# Patient Record
Sex: MALE | Race: WHITE | NOT HISPANIC OR LATINO | Employment: STUDENT | URBAN - METROPOLITAN AREA
[De-identification: names, ages, dates, MRNs, and addresses within clinical notes are randomized per-mention and may not be internally consistent; named-entity substitution may affect disease eponyms.]

---
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
## 2019-02-28 ENCOUNTER — TELEPHONE (OUTPATIENT)
Dept: PSYCHIATRY | Facility: CLINIC | Age: 19
End: 2019-02-28

## 2019-03-11 ENCOUNTER — OFFICE VISIT (OUTPATIENT)
Dept: PSYCHIATRY | Facility: CLINIC | Age: 19
End: 2019-03-11
Payer: COMMERCIAL

## 2019-03-11 VITALS
HEIGHT: 70 IN | BODY MASS INDEX: 19.93 KG/M2 | DIASTOLIC BLOOD PRESSURE: 79 MMHG | WEIGHT: 139.25 LBS | SYSTOLIC BLOOD PRESSURE: 142 MMHG | HEART RATE: 99 BPM

## 2019-03-11 DIAGNOSIS — F84.0 AUTISM SPECTRUM DISORDER: Primary | ICD-10-CM

## 2019-03-11 DIAGNOSIS — F90.8 HYPERKINESIS OF CHILDHOOD WITH DEVELOPMENTAL DELAY: ICD-10-CM

## 2019-03-11 PROCEDURE — 90785 PSYTX COMPLEX INTERACTIVE: CPT | Mod: S$GLB,,, | Performed by: PSYCHIATRY & NEUROLOGY

## 2019-03-11 PROCEDURE — 90833 PSYTX W PT W E/M 30 MIN: CPT | Mod: PBBFAC | Performed by: PSYCHIATRY & NEUROLOGY

## 2019-03-11 PROCEDURE — 90833 PSYTX W PT W E/M 30 MIN: CPT | Mod: S$GLB,,, | Performed by: PSYCHIATRY & NEUROLOGY

## 2019-03-11 PROCEDURE — 90785 PR INTERACTIVE COMPLEXITY: ICD-10-PCS | Mod: S$GLB,,, | Performed by: PSYCHIATRY & NEUROLOGY

## 2019-03-11 PROCEDURE — 99999 PR PBB SHADOW E&M-EST. PATIENT-LVL III: ICD-10-PCS | Mod: PBBFAC,,, | Performed by: PSYCHIATRY & NEUROLOGY

## 2019-03-11 PROCEDURE — 99999 PR PBB SHADOW E&M-EST. PATIENT-LVL III: CPT | Mod: PBBFAC,,, | Performed by: PSYCHIATRY & NEUROLOGY

## 2019-03-11 PROCEDURE — 99213 PR OFFICE/OUTPT VISIT, EST, LEVL III, 20-29 MIN: ICD-10-PCS | Mod: S$GLB,,, | Performed by: PSYCHIATRY & NEUROLOGY

## 2019-03-11 PROCEDURE — 90785 PSYTX COMPLEX INTERACTIVE: CPT | Mod: PBBFAC | Performed by: PSYCHIATRY & NEUROLOGY

## 2019-03-11 PROCEDURE — 99213 OFFICE O/P EST LOW 20 MIN: CPT | Mod: S$GLB,,, | Performed by: PSYCHIATRY & NEUROLOGY

## 2019-03-11 PROCEDURE — 90833 PR PSYCHOTHERAPY W/PATIENT W/E&M, 30 MIN (ADD ON): ICD-10-PCS | Mod: S$GLB,,, | Performed by: PSYCHIATRY & NEUROLOGY

## 2019-03-11 PROCEDURE — 99213 OFFICE O/P EST LOW 20 MIN: CPT | Mod: PBBFAC | Performed by: PSYCHIATRY & NEUROLOGY

## 2019-03-11 RX ORDER — RISPERIDONE 2 MG/1
2 TABLET ORAL NIGHTLY
Qty: 30 TABLET | Refills: 5 | Status: SHIPPED | OUTPATIENT
Start: 2019-03-11 | End: 2019-06-26 | Stop reason: SDUPTHER

## 2019-03-11 RX ORDER — AMANTADINE HYDROCHLORIDE 100 MG/1
100 CAPSULE, GELATIN COATED ORAL EVERY MORNING
Qty: 30 CAPSULE | Refills: 5 | Status: SHIPPED | OUTPATIENT
Start: 2019-03-11 | End: 2019-06-26 | Stop reason: SDUPTHER

## 2019-03-11 NOTE — PROGRESS NOTES
"Outpatient Psychiatry Follow-Up Visit (MD/NP)  3/11/2019    Clinical Status of Patient:  Outpatient (Ambulatory)    Chief Complaint:  Ramiro Hoffman is a 18 y.o. male who presents today for follow-up of altered social communication and impaired social judgment.    Met with patient and then patient with mother.    12th grade for SY18-19  [facility]    Interval History and Content of Current Session:  Interim Events/Subjective Report/Content of Current Session:        Doing about the same: no problems misconduct, no problems with depression or anger. Has "community based instruction" class, social studies, reading/english, math and piano. 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     Psychotherapy:  · Target symptoms: adjustment, montitor for suicidal thoughts, social frustration at school  · Why chosen therapy is appropriate versus another modality: relevant to diagnosis, patient responds to this modality  · Outcome monitoring methods: self-report, observation, teacher report, feedback from family  · Therapeutic intervention type: supportive psychotherapy, interactive psychotherapy  · Topics discussed/themes: relationships difficulties, difficulty managing affect in interpersonal relationships  · The patient's response to the intervention is accepting. The patient's progress toward treatment goals is good.   · Duration of intervention: 20 minutes    Review of Systems   History obtained from the patient.  General ROS: negative for - " fatigue and weight loss. 6 lb weight gain  Psychological ROS: negative for - depression, hallucinations, hostility, mood swings, physical abuse, sexual abuse or sleep disturbances  Ophthalmic ROS: negative for - decreased vision or dry eyes  ENT ROS: negative for - epistaxis, nasal congestion or oral lesions  Hematological and Lymphatic ROS: negative for - bruising, jaundice or pallor  Endocrine ROS: negative for - galactorrhea, skin changes or temperature intolerance  Respiratory ROS: no cough or shortness of breath  Cardiovascular ROS: no chest pain or tachycardia  Gastrointestinal ROS: no abdominal pain, change in bowel habits  Urinary ROS: no dysuria, trouble voiding or hematuria  Neurological ROS: negative for - headaches, seizures, tremors. +eyeblink tics when he feels anxious but denies noticing any other AIMS  Dermatological ROS: negative for pruritus. + mild cystic acne    Past Medical, Family and Social History: The patient's past medical, family and social history have been reviewed and updated as appropriate within the electronic medical record - see encounter notes.  Past Medical History:   Diagnosis Date    ADHD (attention deficit hyperactivity disorder)     Autism spectrum disorder 2004     Current Outpatient Medications on File Prior to Visit   Medication Sig Dispense Refill    amantadine HCl (SYMMETREL) 100 mg capsule Take 1 capsule (100 mg total) by mouth every morning. 90 capsule 1    risperiDONE (RISPERDAL) 2 MG tablet Take 1 tablet (2 mg total) by mouth every evening. 90 tablet 1     No current facility-administered medications on file prior to visit.    Compliance: yes  Side effects: None. Ramiro denies any problems with headache, stomach upset, weight loss, insomnia, chest pain, palpitations, tics, or tremors.    Risk Parameters:  Patient reports no suicidal ideation  Patient reports no homicidal ideation  Patient reports no self-injurious behavior  Patient reports no violent behavior  "  Ramiro denies any use of alcohol, cannabis, or other drugs.    Exam (detailed: at least 9 elements; comprehensive: all 15 elements)   Constitutional  Vitals:   height is 5' 10" (1.778 m) and weight is 63.2 kg (139 lb 3.5 oz). His blood pressure is 142/79 (abnormal) and his pulse is 99.      General:  age appropriate, casually dressed, no change in acne since last visit   Musculoskeletal  Muscle Strength/Tone:  no tremor, no tic.    Gait & Station:  non-ataxic   Psychiatric  Speech:  spontaneous, monotone, conversational volume   Mood & Affect:  euthymic  blunted, mood-congruent   Thought Process:  concrete, with some over-personalized tangents   Associations:  intact   Thought Content:  normal, no suicidality, no homicidality, delusions, or paranoia-no.    Insight:  has awareness of illness, but it is concrete and rudimentary, though he is alot less defensive about it than he was 2 years ago   Judgement: impaired due to autism   Orientation:  person, place, time/date, situation, day of week   Memory: intact for content of interview   Language: awkward prosody, some semantic/pragmatic errors, low vocabulary for age.   Attention Span & Concentration:  able to focus   Fund of Knowledge:  familiar with aspects of current personal life     Assessment and Diagnosis   Status/Progress: Based on the examination today, the patient's problem(s) is/are optimally controlled given his ability.  New problems have not been presented today. Comorbidities are complicating management of the primary condition.  There are no active rule-out diagnoses for this patient at this time.    General Impression:   1. Autism spectrum disorder, level 2     2. Hyperkinesis of childhood with developmental delay       Intervention/Counseling/Treatment Plan   · Medication Management: continue current medications, risperidone 2 mg qHS and amantadine 100 mg daily  · Outside records/collateral information from additional sources: reviewed collateral " reports from his parents  · Counseling provided with family as follows: techniques he continues to appropriately confront and protect himself from bullying behavior by peers, risks and benefits of treatment options, including medications, were discussed with the patient, risk factor reduction, family education  · anticipates Pay Check program at [facility] starting August 2019    Return to Clinic: 3 months    INTERACTIVE COMPLEXITY:  Reason: Expressive communication skills have not developed adequately to explain symptoms and response to treatment, requiring the use of interactive methods and materials to elicit data.    ___________________________________________________________________________________________________    (for my future reference as he begins the below program near the time of next visit)   Post-Secondary Apprenticeship for Youth (Pay Check)    The Postsecondary Apprenticeship  for Youth (Pay Check)  is an innovative collaboration between the [facility] ([facility]), [facility], [employer] [facility], [employer], public [facility] [employer] school associations, and the [facility].     Pay Check is a 3-5 semester program wherein students select courses at [employer] related to [facility] targeted apprenticeship areas, participate in professional career development activities, learn community and work skills, and gain employment experience through a paid apprenticeship at the [facility].    [employer] First Semester Courses    Survey of Computer Applications  College Career Success Skills  Personal Finance  Customer Service  Introduction to Business  Interpersonal Communications  Document Production  General Office Procedures  First Aid  American Sign Language  Fitness  [facility] Apprenticeship Area Examples    Sterile Processing                      (Patient Escort)  Patient  Access  Human Resources Clert  OT/PT Aide  Central [employer] Supply  Food, Dietary & Nutrition  Pre-Employment Transition Skills and On-the-Job Training    Work-Based Learning Experiences  Job Exploration  Interview Skills  Transportation Skills  Navigational Skills  Independent Living Skills            Time Management  Problem Solving Skills  Banking  Budget and Money Management Skills  Self-Advocacy  Goal Setting  Social Interactions  Stress Management     Student Admission Criteria    Be 18-22 years of age with IEP or Section 504 Plan  Eligible for LRS services  Submit a brief description of why they want to complete the PAY Check program including letters of recommendation  Parents/guardians must support their student to participate in community college, learn community and work-related skills, accept a paid apprenticeship, and ultimately support their student to reach the outcome of competitive employment in the career of their choice.

## 2019-04-15 ENCOUNTER — OFFICE VISIT (OUTPATIENT)
Dept: INTERNAL MEDICINE | Facility: CLINIC | Age: 19
End: 2019-04-15
Payer: COMMERCIAL

## 2019-04-15 ENCOUNTER — TELEPHONE (OUTPATIENT)
Dept: INTERNAL MEDICINE | Facility: CLINIC | Age: 19
End: 2019-04-15

## 2019-04-15 VITALS
HEART RATE: 70 BPM | HEIGHT: 70 IN | DIASTOLIC BLOOD PRESSURE: 80 MMHG | WEIGHT: 135.38 LBS | SYSTOLIC BLOOD PRESSURE: 120 MMHG | OXYGEN SATURATION: 99 % | BODY MASS INDEX: 19.38 KG/M2

## 2019-04-15 DIAGNOSIS — E73.9 LACTOSE INTOLERANCE: ICD-10-CM

## 2019-04-15 DIAGNOSIS — H61.20 IMPACTED CERUMEN, UNSPECIFIED LATERALITY: Primary | ICD-10-CM

## 2019-04-15 PROCEDURE — 99213 OFFICE O/P EST LOW 20 MIN: CPT | Mod: PBBFAC | Performed by: INTERNAL MEDICINE

## 2019-04-15 PROCEDURE — 99999 PR PBB SHADOW E&M-EST. PATIENT-LVL III: ICD-10-PCS | Mod: PBBFAC,,, | Performed by: INTERNAL MEDICINE

## 2019-04-15 PROCEDURE — 99213 PR OFFICE/OUTPT VISIT, EST, LEVL III, 20-29 MIN: ICD-10-PCS | Mod: S$PBB,,, | Performed by: INTERNAL MEDICINE

## 2019-04-15 PROCEDURE — 99999 PR PBB SHADOW E&M-EST. PATIENT-LVL III: CPT | Mod: PBBFAC,,, | Performed by: INTERNAL MEDICINE

## 2019-04-15 PROCEDURE — 99213 OFFICE O/P EST LOW 20 MIN: CPT | Mod: S$PBB,,, | Performed by: INTERNAL MEDICINE

## 2019-04-15 NOTE — PATIENT INSTRUCTIONS
High Fiber Diet  Fiber is present in all fruits, vegetables, cereals and grains. Fiber passes through the body undigested. A high fiber diet helps food move through the intestinal tract. The added bulk is helpful in preventing constipation. In people with diverticulosis it serves to clean out the pouches along the colon wall while preventing new ones from forming. A high fiber diet also reduces the risk of colon cancer, decreases blood cholesterol and prevents high blood sugar in people with diabetes.    The foods listed below are high in fiber and should be included in your diet. If you are not used to high fiber foods, start with 1 or 2 foods from this list. Every 3-4 days add a new one to your diet until you are eating 4 high fiber foods per day. This should give you 20-35 Gm of fiber/day. It is also important to drink a lot of water when you are on this diet (6-8 glasses a day). Water causes the fiber to swell and increases the benefit.  Foods High In Dietary Fiber:  BREADS: Made with 100% whole wheat flour; kayden, wheat or rye crackers; tortillas, bran muffins  CEREALS: Whole grain cereal with bran (Chex, Raisin Bran, Corn Bran), oatmeal, rolled oats, granola, wheat flakes, brown rice  NUTS: Any nuts  FRUITS: All fresh fruits along with edible skins, (bananas, citrus fruit, mangoes, pears, prunes, raisins, apples, pineapple, apricot, melon, jams and marmalades), fruit juices (especially prune juice)  VEGETABLES: All types, preferably raw or lightly cooked: especially, celery, eggplant, potatoes,spinach, broccoli, brussel sprouts, winter squash, carrots, cauliflower, soybeans, lentils, fresh and dried beans of all kinds  OTHER: Popcorn, any spices  © 9261-8149 [employer]. [address] [place] [address], [place] [address]. All rights reserved. This information is not intended as a substitute for professional medical care. Always follow your healthcare professional's instructions.

## 2019-04-15 NOTE — PROGRESS NOTES
Subjective:       Patient ID: Ramiro Hoffman is a 18 y.o. male.    Chief Complaint: Follow-up (4 month (needs a excused note to go back to school))    Patient is here for followup for chronic conditions.    Wt has been stable.    Denies loose stools or significant abd pains. One time diff swallowing mild choking on popcorn kernel.    Some stress at school, same as he has been dealing with.    Review of Systems   Constitutional: Negative for appetite change and unexpected weight change.   Respiratory: Negative for chest tightness and shortness of breath.    Cardiovascular: Negative for chest pain.   Gastrointestinal: Negative for abdominal pain.   Endocrine: Negative for cold intolerance and heat intolerance.   Genitourinary: Negative for difficulty urinating, scrotal swelling and testicular pain.   Skin:        No lesions   Psychiatric/Behavioral: Negative for dysphoric mood, self-injury, sleep disturbance and suicidal ideas. The patient is nervous/anxious.        Objective:      Physical Exam   Constitutional: He is oriented to person, place, and time. He appears well-developed and well-nourished. No distress.   HENT:   Head: Normocephalic and atraumatic.   Cerumen impaction bilat   Eyes: No scleral icterus.   Neck: Normal range of motion. No thyromegaly present.   Cardiovascular: Normal rate, regular rhythm and normal heart sounds. Exam reveals no gallop and no friction rub.   No murmur heard.  Pulmonary/Chest: Effort normal and breath sounds normal. No respiratory distress. He has no wheezes. He has no rales.   Abdominal: Soft. Bowel sounds are normal. He exhibits no distension and no mass. There is no tenderness. There is no rebound and no guarding.   Musculoskeletal: Normal range of motion. He exhibits no edema.   Lymphadenopathy:     He has no cervical adenopathy.   Neurological: He is alert and oriented to person, place, and time.   Skin: No lesion noted.   Psychiatric: He has a normal mood and affect.  Thought content normal.       Assessment:       1. Impacted cerumen, unspecified laterality    2. Lactose intolerance        Plan:       Ramiro was seen today for follow-up.    Diagnoses and all orders for this visit:    Lactose intolerance  Pt given handouts on fiber intake.    Ear wax cerumen -- removal attempted but not successful, will refer to ENT    Keep a track on his wt. Sister likely has celiac but he lacks symptoms.    Stress at school work with school SW    Health Maintenance       Date Due Completion Date    TETANUS VACCINE 09/07/2020 9/7/2010          Follow up in about 1 year (around 4/15/2020).    No future appointments.

## 2019-06-06 ENCOUNTER — OFFICE VISIT (OUTPATIENT)
Dept: OTOLARYNGOLOGY | Facility: CLINIC | Age: 19
End: 2019-06-06
Payer: COMMERCIAL

## 2019-06-06 VITALS
BODY MASS INDEX: 19.3 KG/M2 | SYSTOLIC BLOOD PRESSURE: 133 MMHG | HEART RATE: 92 BPM | DIASTOLIC BLOOD PRESSURE: 81 MMHG | WEIGHT: 134.5 LBS

## 2019-06-06 DIAGNOSIS — H61.23 BILATERAL IMPACTED CERUMEN: Primary | ICD-10-CM

## 2019-06-06 PROCEDURE — 69210 REMOVE IMPACTED EAR WAX UNI: CPT | Mod: S$GLB,,, | Performed by: NURSE PRACTITIONER

## 2019-06-06 PROCEDURE — 99999 PR PBB SHADOW E&M-EST. PATIENT-LVL II: ICD-10-PCS | Mod: PBBFAC,,, | Performed by: NURSE PRACTITIONER

## 2019-06-06 PROCEDURE — 99999 PR PBB SHADOW E&M-EST. PATIENT-LVL II: CPT | Mod: PBBFAC,,, | Performed by: NURSE PRACTITIONER

## 2019-06-06 PROCEDURE — 99499 NO LOS: ICD-10-PCS | Mod: S$GLB,,, | Performed by: NURSE PRACTITIONER

## 2019-06-06 PROCEDURE — 69210 EAR CERUMEN REMOVAL: ICD-10-PCS | Mod: S$GLB,,, | Performed by: NURSE PRACTITIONER

## 2019-06-06 PROCEDURE — 99499 UNLISTED E&M SERVICE: CPT | Mod: S$GLB,,, | Performed by: NURSE PRACTITIONER

## 2019-06-06 NOTE — PROCEDURES
Ear Cerumen Removal  Date/Time: 6/6/2019 10:23 AM  Performed by: Garfield Foss NP  Authorized by: Garfield Foss NP     Location details:  Both ears  Procedure type: curette    Cerumen  Removal Results:  Cerumen completely removed  Patient tolerance:  Patient tolerated the procedure well with no immediate complications     Procedure Note:    The patient was brought to the minor procedure room and placed under the operating microscope of the left ear canal which was cleaned of ceruminous debris. Using a combination of suction, curettes and cup forceps the patient's cerumen impaction was removed. The tympanic membrane was evaluated and was unremarkable. The patient tolerated the procedure well. There were no complications.  Procedure Note:    Patient was brought to the minor procedure room and using the operating microscope of the right ear canal which was cleaned of ceruminous debris and four oval, pearly rocks. There was a significant cerumen impaction.  Using a combination of suction, curettes and cup forceps the patient's cerumen impaction was removed. Tympanic membrane intact. Pt tolerated well. There were no complications.

## 2019-06-06 NOTE — LETTER
June 6, 2019      Scott Hinton MD  [address]  [facility] [address]           [facility] - Otorhinolaryngology  [address]  [facility] [address]  Phone: [phone]  Fax: [phone]          Patient: Ramiro Hoffman   MR Number: [id number]   YOB: 2000   Date of Visit: 6/6/2019       Dear Dr. Scott Hinton:    Thank you for referring Ramiro Hoffman to me for evaluation. Attached you will find relevant portions of my assessment and plan of care.    If you have questions, please do not hesitate to call me. I look forward to following Ramiro Hoffman along with you.    Sincerely,    Garfield Foss, NP    Enclosure  CC:  No Recipients    If you would like to receive this communication electronically, please contact [email or web address] or [phone] to request more information on [facility] access.    For providers and/or their staff who would like to refer a patient to [facility], please contact us through our one-stop-shop provider referral line, [facility], at [phone].    If you feel you have received this communication in error or would no longer like to receive these types of communications, please e-mail [email or web address]

## 2019-06-26 ENCOUNTER — OFFICE VISIT (OUTPATIENT)
Dept: PSYCHIATRY | Facility: CLINIC | Age: 19
End: 2019-06-26
Payer: COMMERCIAL

## 2019-06-26 VITALS
WEIGHT: 138.88 LBS | DIASTOLIC BLOOD PRESSURE: 84 MMHG | SYSTOLIC BLOOD PRESSURE: 139 MMHG | BODY MASS INDEX: 19.88 KG/M2 | HEART RATE: 103 BPM | HEIGHT: 70 IN

## 2019-06-26 DIAGNOSIS — F90.8 HYPERKINESIS OF CHILDHOOD WITH DEVELOPMENTAL DELAY: ICD-10-CM

## 2019-06-26 DIAGNOSIS — F84.0 AUTISM SPECTRUM DISORDER: Primary | ICD-10-CM

## 2019-06-26 PROCEDURE — 99213 OFFICE O/P EST LOW 20 MIN: CPT | Mod: S$GLB,,, | Performed by: PSYCHIATRY & NEUROLOGY

## 2019-06-26 PROCEDURE — 90833 PSYTX W PT W E/M 30 MIN: CPT | Mod: S$GLB,,, | Performed by: PSYCHIATRY & NEUROLOGY

## 2019-06-26 PROCEDURE — 99999 PR PBB SHADOW E&M-EST. PATIENT-LVL III: CPT | Mod: PBBFAC,,, | Performed by: PSYCHIATRY & NEUROLOGY

## 2019-06-26 PROCEDURE — 90833 PR PSYCHOTHERAPY W/PATIENT W/E&M, 30 MIN (ADD ON): ICD-10-PCS | Mod: S$GLB,,, | Performed by: PSYCHIATRY & NEUROLOGY

## 2019-06-26 PROCEDURE — 99999 PR PBB SHADOW E&M-EST. PATIENT-LVL III: ICD-10-PCS | Mod: PBBFAC,,, | Performed by: PSYCHIATRY & NEUROLOGY

## 2019-06-26 PROCEDURE — 3008F BODY MASS INDEX DOCD: CPT | Mod: CPTII,S$GLB,, | Performed by: PSYCHIATRY & NEUROLOGY

## 2019-06-26 PROCEDURE — 99213 PR OFFICE/OUTPT VISIT, EST, LEVL III, 20-29 MIN: ICD-10-PCS | Mod: S$GLB,,, | Performed by: PSYCHIATRY & NEUROLOGY

## 2019-06-26 PROCEDURE — 3008F PR BODY MASS INDEX (BMI) DOCUMENTED: ICD-10-PCS | Mod: CPTII,S$GLB,, | Performed by: PSYCHIATRY & NEUROLOGY

## 2019-06-26 RX ORDER — RISPERIDONE 2 MG/1
2 TABLET ORAL NIGHTLY
Qty: 30 TABLET | Refills: 5 | Status: SHIPPED | OUTPATIENT
Start: 2019-06-26 | End: 2019-12-03 | Stop reason: SDUPTHER

## 2019-06-26 RX ORDER — AMANTADINE HYDROCHLORIDE 100 MG/1
100 CAPSULE, GELATIN COATED ORAL EVERY MORNING
Qty: 30 CAPSULE | Refills: 5 | Status: SHIPPED | OUTPATIENT
Start: 2019-06-26 | End: 2019-12-03 | Stop reason: SDUPTHER

## 2019-06-26 NOTE — PROGRESS NOTES
"Outpatient Psychiatry Follow-Up Visit (MD/NP)  6/26/2019    Clinical Status of Patient:  Outpatient (Ambulatory)    Chief Complaint:  Ramiro Hoffman is a 18 y.o. male who presents today for follow-up of altered social communication and impaired social judgment.    Met with patient and then patient with mother.    Graduated with certificate May 2019  [employer]    Interval History and Content of Current Session:  Interim Events/Subjective Report/Content of Current Session:        Doing about the same: no problems misconduct, no problems with depression or anger. Has "community based instruction" class, social studies, reading/english, math and piano. Still claims girlfriend at school who is also a spec ed student, but reports he feels "jealous, like a feel like she is cheating when she sits next to this other dusty more than she sits next to me and he puts his arm around her." Discussed what it might take to push him to get another girlfriend         Starts [employer] program at [facility] starting in August 2019        Has gained back 6 lb of the 25 lb he had lost last fall.     Psychotherapy:  · Target symptoms: distractability, recurrent depression, social adjustment to major life transition  · Why chosen therapy is appropriate versus another modality: relevant to diagnosis, patient responds to this modality  · Outcome monitoring methods: self-report, observation, teacher report, feedback from family  · Therapeutic intervention type: supportive psychotherapy, interactive psychotherapy  · Topics discussed/themes: relationships difficulties, difficulty managing affect in interpersonal relationships  · The patient's response to the intervention is accepting. The patient's progress toward treatment goals is good.   · Duration of intervention: 20 minutes    Review of Systems   History obtained from the patient.  General ROS: negative for - fatigue and weight loss. Positive for- 4 lb weight gain since last " visit, now at ideal body weight  Psychological ROS: negative for - depression, hallucinations, hostility, mood swings, physical abuse, sexual abuse or sleep disturbances  Ophthalmic ROS: negative for - decreased vision or dry eyes  ENT ROS: negative for - epistaxis, nasal congestion or oral lesions  Hematological and Lymphatic ROS: negative for - bruising, jaundice or pallor  Endocrine ROS: negative for - galactorrhea, skin changes or temperature intolerance  Respiratory ROS: no cough or shortness of breath  Cardiovascular ROS: no chest pain or tachycardia  Gastrointestinal ROS: no abdominal pain, change in bowel habits  Urinary ROS: no dysuria, trouble voiding or hematuria  Neurological ROS: negative for - headaches, seizures, tremors. +eyeblink tics when he feels anxious but denies noticing any other AIMS  Dermatological ROS: negative for pruritus. + mild cystic acne    Past Medical, Family and Social History: The patient's past medical, family and social history have been reviewed and updated as appropriate within the electronic medical record - see encounter notes.  Past Medical History:   Diagnosis Date    ADHD (attention deficit hyperactivity disorder)     Autism spectrum disorder 2004    Lactose intolerance      Current Outpatient Medications on File Prior to Visit   Medication Sig Dispense Refill    amantadine HCl (SYMMETREL) 100 mg capsule Take 1 capsule (100 mg total) by mouth every morning. 90 capsule 1    risperiDONE (RISPERDAL) 2 MG tablet Take 1 tablet (2 mg total) by mouth every evening. 90 tablet 1     No current facility-administered medications on file prior to visit.    Compliance: yes  Side effects: None. Ramiro denies any problems with headache, stomach upset, weight loss, insomnia, chest pain, palpitations, tics, or tremors.    Risk Parameters:  Patient reports no suicidal ideation  Patient reports no homicidal ideation  Patient reports no self-injurious behavior  Patient reports no  "violent behavior   Ramiro denies any use of alcohol, cannabis, or other drugs.    Exam (detailed: at least 9 elements; comprehensive: all 15 elements)   Constitutional  Vitals:   height is 5' 10" (1.778 m) and weight is 63 kg (138 lb 14.2 oz). His blood pressure is 139/84 and his pulse is 103.      General:  age appropriate, casually dressed, acne improved   Musculoskeletal  Muscle Strength/Tone:  no tremor, no tic.    Gait & Station:  non-ataxic   Psychiatric  Speech:  spontaneous, monotone, conversational volume   Mood & Affect:  euthymic  blunted, mood-congruent   Thought Process:  concrete, with some over-personalized tangents   Associations:  intact   Thought Content:  normal, no suicidality, no homicidality, delusions, or paranoia-no.    Insight:  has awareness of illness, but it is concrete and rudimentary, though he is no longer defensive about it   Judgement: impaired due to autism   Orientation:  person, place, time/date, situation, day of week   Memory: intact for content of interview   Language: awkward prosody, some semantic/pragmatic errors, low vocabulary for age.   Attention Span & Concentration:  able to focus   Fund of Knowledge:  familiar with aspects of current personal life     Assessment and Diagnosis   Status/Progress: Based on the examination today, the patient's problem(s) is/are optimally controlled given his ability.  New problems have not been presented today. Comorbidities are complicating management of the primary condition.  There are no active rule-out diagnoses for this patient at this time.    General Impression:   1. Autism spectrum disorder, level 2     2. Hyperkinesis of childhood with developmental delay       Intervention/Counseling/Treatment Plan   · Medication Management: continue current medications, risperidone 2 mg qHS and amantadine 100 mg daily  · Outside records/collateral information from additional sources: reviewed collateral reports from his parents  · Counseling " provided with family as follows: techniques he continues to appropriately confront and protect himself from bullying behavior by peers, risks and benefits of treatment options, including medications, were discussed with the patient, risk factor reduction, family education  · anticipates Pay Check program at [facility] starting August 2019    Return to Clinic: 3 months    ___________________________________________________________________________________________________    (for my future reference as he begins the below program on August 9, 2019)     Post-Secondary Apprenticeship for Youth (Pay Check)    The Postsecondary Apprenticeship  for Youth (Pay Check)  is an innovative collaboration between the [facility] [place] ([facility]), [facility], [facility], [employer], public [facility] school associations, and the [facility].     Pay Check is a 3-5 semester program wherein students select courses at [facility] related to [facility] targeted apprenticeship areas, participate in professional career development activities, learn community and work skills, and gain employment experience through a paid apprenticeship at the [facility].    [facility] First Semester Courses    Survey of Computer Applications  College Career Success Skills  Personal Finance  Customer Service  Introduction to Business  Interpersonal Communications  Document Production  General Office Procedures  First Aid  American Sign Language  Fitness  [facility] Apprenticeship Area Examples    Sterile Processing                      (Patient Escort)  Patient Access  Human Resources Clert  OT/PT Aide  Central [employer] Supply  Food, Dietary & Nutrition  Pre-Employment Transition Skills and On-the-Job Training    Work-Based Learning Experiences  Job Exploration  Interview Skills  Transportation Skills  Navigational  Skills  Independent Living Skills            Time Management  Problem Solving Skills  Banking  Budget and Money Management Skills  Self-Advocacy  Goal Setting  Social Interactions  Stress Management     Student Admission Criteria    Be 18-22 years of age with IEP or Section 504 Plan  Eligible for LRS services  Submit a brief description of why they want to complete the PAY Check program including letters of recommendation  Parents/guardians must support their student to participate in community college, learn community and work-related skills, accept a paid apprenticeship, and ultimately support their student to reach the outcome of competitive employment in the career of their choice.

## 2019-12-03 ENCOUNTER — OFFICE VISIT (OUTPATIENT)
Dept: PSYCHIATRY | Facility: CLINIC | Age: 19
End: 2019-12-03
Payer: COMMERCIAL

## 2019-12-03 VITALS
DIASTOLIC BLOOD PRESSURE: 68 MMHG | BODY MASS INDEX: 19.61 KG/M2 | HEART RATE: 91 BPM | SYSTOLIC BLOOD PRESSURE: 121 MMHG | WEIGHT: 136.69 LBS

## 2019-12-03 DIAGNOSIS — F84.0 AUTISM SPECTRUM DISORDER: Primary | ICD-10-CM

## 2019-12-03 DIAGNOSIS — Z79.899 ENCOUNTER FOR LONG-TERM (CURRENT) USE OF OTHER MEDICATIONS: ICD-10-CM

## 2019-12-03 DIAGNOSIS — F90.8 HYPERKINESIS OF CHILDHOOD WITH DEVELOPMENTAL DELAY: ICD-10-CM

## 2019-12-03 PROCEDURE — 3008F BODY MASS INDEX DOCD: CPT | Mod: CPTII,S$GLB,, | Performed by: PSYCHIATRY & NEUROLOGY

## 2019-12-03 PROCEDURE — 99213 PR OFFICE/OUTPT VISIT, EST, LEVL III, 20-29 MIN: ICD-10-PCS | Mod: S$GLB,,, | Performed by: PSYCHIATRY & NEUROLOGY

## 2019-12-03 PROCEDURE — 99999 PR PBB SHADOW E&M-EST. PATIENT-LVL III: CPT | Mod: PBBFAC,,, | Performed by: PSYCHIATRY & NEUROLOGY

## 2019-12-03 PROCEDURE — 99999 PR PBB SHADOW E&M-EST. PATIENT-LVL III: ICD-10-PCS | Mod: PBBFAC,,, | Performed by: PSYCHIATRY & NEUROLOGY

## 2019-12-03 PROCEDURE — 90833 PR PSYCHOTHERAPY W/PATIENT W/E&M, 30 MIN (ADD ON): ICD-10-PCS | Mod: S$GLB,,, | Performed by: PSYCHIATRY & NEUROLOGY

## 2019-12-03 PROCEDURE — 3008F PR BODY MASS INDEX (BMI) DOCUMENTED: ICD-10-PCS | Mod: CPTII,S$GLB,, | Performed by: PSYCHIATRY & NEUROLOGY

## 2019-12-03 PROCEDURE — 99213 OFFICE O/P EST LOW 20 MIN: CPT | Mod: S$GLB,,, | Performed by: PSYCHIATRY & NEUROLOGY

## 2019-12-03 PROCEDURE — 90833 PSYTX W PT W E/M 30 MIN: CPT | Mod: S$GLB,,, | Performed by: PSYCHIATRY & NEUROLOGY

## 2019-12-03 RX ORDER — RISPERIDONE 2 MG/1
2 TABLET ORAL NIGHTLY
Qty: 30 TABLET | Refills: 5 | Status: SHIPPED | OUTPATIENT
Start: 2019-12-03 | End: 2020-08-19 | Stop reason: SDUPTHER

## 2019-12-03 RX ORDER — AMANTADINE HYDROCHLORIDE 100 MG/1
100 CAPSULE, GELATIN COATED ORAL EVERY MORNING
Qty: 30 CAPSULE | Refills: 5 | Status: SHIPPED | OUTPATIENT
Start: 2019-12-03 | End: 2020-08-19 | Stop reason: SDUPTHER

## 2019-12-03 NOTE — PROGRESS NOTES
"Outpatient Psychiatry Follow-Up Visit (MD/NP)  12/3/2019    Clinical Status of Patient:  Outpatient (Ambulatory)    Chief Complaint:  Ramiro Hoffman is a 19 y.o. male who presents today for follow-up of altered social communication and impaired social judgment.    Met with patient and then patient with mother.    Graduated with certificate May 2019  [employer]-  Now in a post-socondary program that I describe as a post-script to this note    Interval History and Content of Current Session:  Interim Events/Subjective Report/Content of Current Session:        Doing about the same: no problems misconduct, no problems with depression or anger. Has "community based instruction" class, social studies, reading/english, math and piano. Still claims girlfriend at school who is also a spec ed student, but reports he feels "jealous, like a feel like she is cheating when she sits next to this other dusty more than she sits next to me and he puts his arm around her." Discussed what it might take to push him to get another girlfriend          Likes very much and says he is doing well at [employer] program at [facility] that he started this past summer. 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  [employer] one day a week- cleaning toys  One day a week in services at [facility]  Half to one day a week in [employer] training  Half day social event/outing once weekly              Psychotherapy:  · Target symptoms: distractability, recurrent depression, social adjustment to major life transition  · Why chosen therapy is appropriate versus another modality: relevant to diagnosis, patient responds to this modality  · Outcome monitoring methods: self-report, observation, teacher report, feedback from family  · Therapeutic " intervention type: supportive psychotherapy, interactive psychotherapy  · Topics discussed/themes: relationships difficulties, difficulty managing affect in interpersonal relationships  · The patient's response to the intervention is accepting. The patient's progress toward treatment goals is good.   · Duration of intervention: 20 minutes    Review of Systems   History obtained from the patient.  General ROS: negative for - fatigue and weight loss. Positive for- 4 lb weight gain since last visit, now at ideal body weight  Psychological ROS: negative for - depression, hallucinations, hostility, mood swings, physical abuse, sexual abuse or sleep disturbances  Ophthalmic ROS: negative for - decreased vision or dry eyes  ENT ROS: negative for - epistaxis, nasal congestion or oral lesions  Hematological and Lymphatic ROS: negative for - bruising, jaundice or pallor  Endocrine ROS: negative for - galactorrhea, skin changes or temperature intolerance  Respiratory ROS: no cough or shortness of breath  Cardiovascular ROS: no chest pain or tachycardia  Gastrointestinal ROS: no abdominal pain, change in bowel habits  Urinary ROS: no dysuria, trouble voiding or hematuria  Neurological ROS: negative for - headaches, seizures, tremors. +eyeblink tics when he feels anxious but denies noticing any other AIMS  Dermatological ROS: negative for pruritus. + mild cystic acne    Past Medical, Family and Social History: The patient's past medical, family and social history have been reviewed and updated as appropriate within the electronic medical record - see encounter notes.  Past Medical History:   Diagnosis Date    ADHD (attention deficit hyperactivity disorder)     Autism spectrum disorder 2004    Lactose intolerance      Current Outpatient Medications on File Prior to Visit   Medication Sig Dispense Refill    amantadine HCl (SYMMETREL) 100 mg capsule Take 1 capsule (100 mg total) by mouth every morning. 90 capsule 1     risperiDONE (RISPERDAL) 2 MG tablet Take 1 tablet (2 mg total) by mouth every evening. 90 tablet 1     No current facility-administered medications on file prior to visit.    Compliance: yes  Side effects: None. Ramiro denies any problems with headache, stomach upset, weight loss, insomnia, chest pain, palpitations, tics, or tremors.    Risk Parameters:  Patient reports no suicidal ideation  Patient reports no homicidal ideation  Patient reports no self-injurious behavior  Patient reports no violent behavior   Ramiro denies any use of alcohol, cannabis, or other drugs.    Exam (detailed: at least 9 elements; comprehensive: all 15 elements)   Constitutional  Vitals:   weight is 62 kg (136 lb 11 oz). His blood pressure is 121/68 and his pulse is 91.      General:  age appropriate, casually dressed, acne improved   Musculoskeletal  Muscle Strength/Tone:  no tremor, no tic.    Gait & Station:  non-ataxic   Psychiatric  Speech:  spontaneous, monotone, conversational volume   Mood & Affect:  euthymic  blunted, mood-congruent   Thought Process:  concrete, with some over-personalized tangents   Associations:  intact   Thought Content:  normal, no suicidality, no homicidality, delusions, or paranoia-no.    Insight:  has awareness of illness, but it is concrete and rudimentary, though he is no longer defensive about it   Judgement: impaired due to autism   Orientation:  person, place, time/date, situation, day of week   Memory: intact for content of interview   Language: awkward prosody, some semantic/pragmatic errors, low vocabulary for age.   Attention Span & Concentration:  able to focus   Fund of Knowledge:  familiar with aspects of current personal life     Assessment and Diagnosis   Status/Progress: Based on the examination today, the patient's problem(s) is/are optimally controlled given his ability.  New problems have not been presented today. Comorbidities are complicating management of the primary condition.   There are no active rule-out diagnoses for this patient at this time.    General Impression:   1. Autism spectrum disorder, level 2     2. Hyperkinesis of childhood with developmental delay     3. Encounter for long-term (current) use of other medications  Hemoglobin A1c    Lipid panel    Comprehensive metabolic panel    CBC auto differential     Intervention/Counseling/Treatment Plan   · Medication Management: continue current medications, risperidone 2 mg qHS and amantadine 100 mg daily  · Outside records/collateral information from additional sources: reviewed collateral reports from his parents  · Counseling provided with family as follows: techniques he continues to appropriately confront and protect himself from bullying behavior by peers, risks and benefits of treatment options, including medications, were discussed with the patient, risk factor reduction, family education  · continuing Pay Check program at [facility]    Return to Clinic: 6 months    ___________________________________________________________________________________________________    (for my future reference as he begins the below program on August 9, 2019)     Post-Secondary Apprenticeship for Youth (Pay Check)    The Postsecondary Apprenticeship  for Youth (Pay Check)  is an innovative collaboration between the [facility] ([facility]), [facility], [facility], [employer], public [facility] school associations, and the [facility].     Pay Check is a 3-5 semester program wherein students select courses at [facility] related to [facility] targeted apprenticeship areas, participate in professional career development activities, learn community and work skills, and gain employment experience through a paid apprenticeship at the [facility] [facility].    [facility] First Semester Courses    Survey of Tutor Assignment  Applications  College Career Success Skills  Personal Finance  Customer Service  Introduction to Business  Interpersonal Communications  Document Production  General Office Procedures  First Aid  American Sign Language  Fitness  [facility] Apprenticeship Area Examples    Sterile Processing                      (Patient Escort)  Patient Access  Human Resources Clert  OT/PT Aide  [employer]  Food, Dietary & Nutrition  Pre-Employment Transition Skills and On-the-Job Training    Work-Based Learning Experiences  Job Exploration  Interview Skills  Transportation Skills  Navigational Skills  Independent Living Skills            Time Management  Problem Solving Skills  Banking  Budget and Money Management Skills  Self-Advocacy  Goal Setting  Social Interactions  Stress Management     Student Admission Criteria    Be 18-22 years of age with IEP or Section 504 Plan  Eligible for LRS services  Submit a brief description of why they want to complete the PAY Check program including letters of recommendation  Parents/guardians must support their student to participate in community college, learn community and work-related skills, accept a paid apprenticeship, and ultimately support their student to reach the outcome of competitive employment in the career of their choice.

## 2019-12-26 ENCOUNTER — LAB VISIT (OUTPATIENT)
Dept: LAB | Facility: HOSPITAL | Age: 19
End: 2019-12-26
Attending: PSYCHIATRY & NEUROLOGY
Payer: COMMERCIAL

## 2019-12-26 ENCOUNTER — OFFICE VISIT (OUTPATIENT)
Dept: INTERNAL MEDICINE | Facility: CLINIC | Age: 19
End: 2019-12-26
Payer: COMMERCIAL

## 2019-12-26 VITALS
OXYGEN SATURATION: 98 % | HEIGHT: 71 IN | WEIGHT: 136.69 LBS | SYSTOLIC BLOOD PRESSURE: 130 MMHG | DIASTOLIC BLOOD PRESSURE: 80 MMHG | BODY MASS INDEX: 19.14 KG/M2 | HEART RATE: 75 BPM

## 2019-12-26 DIAGNOSIS — Z79.899 ENCOUNTER FOR LONG-TERM (CURRENT) USE OF OTHER MEDICATIONS: ICD-10-CM

## 2019-12-26 DIAGNOSIS — Z00.00 ROUTINE GENERAL MEDICAL EXAMINATION AT A HEALTH CARE FACILITY: Primary | ICD-10-CM

## 2019-12-26 LAB
ALBUMIN SERPL BCP-MCNC: 5 G/DL (ref 3.5–5.2)
ALP SERPL-CCNC: 67 U/L (ref 55–135)
ALT SERPL W/O P-5'-P-CCNC: 11 U/L (ref 10–44)
ANION GAP SERPL CALC-SCNC: 7 MMOL/L (ref 8–16)
AST SERPL-CCNC: 10 U/L (ref 10–40)
BASOPHILS # BLD AUTO: 0.01 K/UL (ref 0–0.2)
BASOPHILS NFR BLD: 0.1 % (ref 0–1.9)
BILIRUB SERPL-MCNC: 0.5 MG/DL (ref 0.1–1)
BUN SERPL-MCNC: 16 MG/DL (ref 6–20)
CALCIUM SERPL-MCNC: 9.7 MG/DL (ref 8.7–10.5)
CHLORIDE SERPL-SCNC: 104 MMOL/L (ref 95–110)
CHOLEST SERPL-MCNC: 130 MG/DL (ref 120–199)
CHOLEST/HDLC SERPL: 2.4 {RATIO} (ref 2–5)
CO2 SERPL-SCNC: 28 MMOL/L (ref 23–29)
CREAT SERPL-MCNC: 0.9 MG/DL (ref 0.5–1.4)
DIFFERENTIAL METHOD: NORMAL
EOSINOPHIL # BLD AUTO: 0 K/UL (ref 0–0.5)
EOSINOPHIL NFR BLD: 0.6 % (ref 0–8)
ERYTHROCYTE [DISTWIDTH] IN BLOOD BY AUTOMATED COUNT: 11.9 % (ref 11.5–14.5)
EST. GFR  (AFRICAN AMERICAN): >60 ML/MIN/1.73 M^2
EST. GFR  (NON AFRICAN AMERICAN): >60 ML/MIN/1.73 M^2
ESTIMATED AVG GLUCOSE: 105 MG/DL (ref 68–131)
GLUCOSE SERPL-MCNC: 91 MG/DL (ref 70–110)
HBA1C MFR BLD HPLC: 5.3 % (ref 4–5.6)
HCT VFR BLD AUTO: 43.9 % (ref 40–54)
HDLC SERPL-MCNC: 54 MG/DL (ref 40–75)
HDLC SERPL: 41.5 % (ref 20–50)
HGB BLD-MCNC: 15.4 G/DL (ref 14–18)
LDLC SERPL CALC-MCNC: 55.8 MG/DL (ref 63–159)
LYMPHOCYTES # BLD AUTO: 2.2 K/UL (ref 1–4.8)
LYMPHOCYTES NFR BLD: 32.2 % (ref 18–48)
MCH RBC QN AUTO: 30.2 PG (ref 27–31)
MCHC RBC AUTO-ENTMCNC: 35.1 G/DL (ref 32–36)
MCV RBC AUTO: 86 FL (ref 82–98)
MONOCYTES # BLD AUTO: 0.6 K/UL (ref 0.3–1)
MONOCYTES NFR BLD: 9.6 % (ref 4–15)
NEUTROPHILS # BLD AUTO: 3.8 K/UL (ref 1.8–7.7)
NEUTROPHILS NFR BLD: 57.5 % (ref 38–73)
NONHDLC SERPL-MCNC: 76 MG/DL
PLATELET # BLD AUTO: 226 K/UL (ref 150–350)
PMV BLD AUTO: 9.6 FL (ref 9.2–12.9)
POTASSIUM SERPL-SCNC: 4 MMOL/L (ref 3.5–5.1)
PROT SERPL-MCNC: 7.8 G/DL (ref 6–8.4)
RBC # BLD AUTO: 5.1 M/UL (ref 4.6–6.2)
SODIUM SERPL-SCNC: 139 MMOL/L (ref 136–145)
TRIGL SERPL-MCNC: 101 MG/DL (ref 30–150)
WBC # BLD AUTO: 6.68 K/UL (ref 3.9–12.7)

## 2019-12-26 PROCEDURE — 99999 PR PBB SHADOW E&M-EST. PATIENT-LVL III: ICD-10-PCS | Mod: PBBFAC,,, | Performed by: INTERNAL MEDICINE

## 2019-12-26 PROCEDURE — 85025 COMPLETE CBC W/AUTO DIFF WBC: CPT

## 2019-12-26 PROCEDURE — 99395 PREV VISIT EST AGE 18-39: CPT | Mod: S$GLB,,, | Performed by: INTERNAL MEDICINE

## 2019-12-26 PROCEDURE — 80053 COMPREHEN METABOLIC PANEL: CPT

## 2019-12-26 PROCEDURE — 83036 HEMOGLOBIN GLYCOSYLATED A1C: CPT

## 2019-12-26 PROCEDURE — 80061 LIPID PANEL: CPT

## 2019-12-26 PROCEDURE — 99395 PR PREVENTIVE VISIT,EST,18-39: ICD-10-PCS | Mod: S$GLB,,, | Performed by: INTERNAL MEDICINE

## 2019-12-26 PROCEDURE — 36415 COLL VENOUS BLD VENIPUNCTURE: CPT

## 2019-12-26 PROCEDURE — 99999 PR PBB SHADOW E&M-EST. PATIENT-LVL III: CPT | Mod: PBBFAC,,, | Performed by: INTERNAL MEDICINE

## 2019-12-26 NOTE — PROGRESS NOTES
Subjective:       Patient ID: Ramiro Hoffman is a 19 y.o. male.    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    Skin itchy/dry. Bro with eczema.    High level of sugars in diet -- coke, candies, mac and cheese.    Has new friend at [facility] program. Still gets depressed at times and will sleep for prolonged durations. Sees psychiatrist but does not have psychotx.    Review of Systems   Constitutional: Negative for appetite change and unexpected weight change (now stable).   Respiratory: Negative for chest tightness and shortness of breath.    Cardiovascular: Negative for chest pain.   Gastrointestinal: Negative for abdominal pain.   Endocrine: Negative for cold intolerance and heat intolerance.   Genitourinary: Negative for difficulty urinating, scrotal swelling and testicular pain.   Skin:        No lesions   Psychiatric/Behavioral: Positive for dysphoric mood. Negative for self-injury, sleep disturbance and suicidal ideas. The patient is not nervous/anxious.        Objective:      Physical Exam   Constitutional: He is oriented to person, place, and time. He appears well-developed and well-nourished. No distress.   HENT:   Head: Normocephalic and atraumatic.   Eyes: No scleral icterus.   Neck: Normal range of motion. No thyromegaly present.   Cardiovascular: Normal rate, regular rhythm and normal heart sounds. Exam reveals no gallop and no friction rub.   No murmur heard.  Pulmonary/Chest: Effort normal and breath sounds normal. No respiratory distress. He has no wheezes. He has no rales.   Abdominal: Soft. Bowel sounds are normal. He exhibits no distension and no mass. There is no tenderness. There is no rebound and no guarding.   Musculoskeletal: Normal range of motion. He exhibits no edema.   Lymphadenopathy:     He has no cervical adenopathy.   Neurological: He is alert and oriented to person, place, and time.   Skin: No lesion noted.   Acne on face and  upper arms/shoulders   Psychiatric: He has a normal mood and affect. Thought content normal.       Assessment:       No diagnosis found.    Plan:       Here for annual.    He will have blood work ordered by psychiatrist.    Lengthy discussion re importance of eating btr.    Discussed possibly seeing psychotx, he does have a counselor through [facility] program.      Health Maintenance       Date Due Completion Date    TETANUS VACCINE 09/07/2020 9/7/2010          Follow up in about 1 year (around 12/26/2020) for Blood work from Dr Wan please.    No future appointments.

## 2020-08-19 ENCOUNTER — OFFICE VISIT (OUTPATIENT)
Dept: PSYCHIATRY | Facility: CLINIC | Age: 20
End: 2020-08-19
Payer: COMMERCIAL

## 2020-08-19 VITALS — BODY MASS INDEX: 18.99 KG/M2 | HEIGHT: 71 IN | WEIGHT: 135.63 LBS

## 2020-08-19 DIAGNOSIS — F84.0 AUTISM SPECTRUM DISORDER: Primary | ICD-10-CM

## 2020-08-19 DIAGNOSIS — F90.8 HYPERKINESIS OF CHILDHOOD WITH DEVELOPMENTAL DELAY: ICD-10-CM

## 2020-08-19 PROCEDURE — 90833 PSYTX W PT W E/M 30 MIN: CPT | Mod: 95,,, | Performed by: PSYCHIATRY & NEUROLOGY

## 2020-08-19 PROCEDURE — 90833 PR PSYCHOTHERAPY W/PATIENT W/E&M, 30 MIN (ADD ON): ICD-10-PCS | Mod: 95,,, | Performed by: PSYCHIATRY & NEUROLOGY

## 2020-08-19 PROCEDURE — 90785 PSYTX COMPLEX INTERACTIVE: CPT | Mod: 95,,, | Performed by: PSYCHIATRY & NEUROLOGY

## 2020-08-19 PROCEDURE — 3008F BODY MASS INDEX DOCD: CPT | Mod: CPTII,,, | Performed by: PSYCHIATRY & NEUROLOGY

## 2020-08-19 PROCEDURE — 99213 OFFICE O/P EST LOW 20 MIN: CPT | Mod: 95,,, | Performed by: PSYCHIATRY & NEUROLOGY

## 2020-08-19 PROCEDURE — 3008F PR BODY MASS INDEX (BMI) DOCUMENTED: ICD-10-PCS | Mod: CPTII,,, | Performed by: PSYCHIATRY & NEUROLOGY

## 2020-08-19 PROCEDURE — 99213 PR OFFICE/OUTPT VISIT, EST, LEVL III, 20-29 MIN: ICD-10-PCS | Mod: 95,,, | Performed by: PSYCHIATRY & NEUROLOGY

## 2020-08-19 PROCEDURE — 90785 PR INTERACTIVE COMPLEXITY: ICD-10-PCS | Mod: 95,,, | Performed by: PSYCHIATRY & NEUROLOGY

## 2020-08-19 RX ORDER — AMANTADINE HYDROCHLORIDE 100 MG/1
100 CAPSULE, GELATIN COATED ORAL EVERY MORNING
Qty: 30 CAPSULE | Refills: 5 | Status: SHIPPED | OUTPATIENT
Start: 2020-08-19 | End: 2021-05-11 | Stop reason: SDUPTHER

## 2020-08-19 RX ORDER — RISPERIDONE 2 MG/1
2 TABLET ORAL NIGHTLY
Qty: 30 TABLET | Refills: 5 | Status: SHIPPED | OUTPATIENT
Start: 2020-08-19 | End: 2021-05-11 | Stop reason: SDUPTHER

## 2020-08-19 NOTE — PROGRESS NOTES
Outpatient Psychiatry Follow-Up Visit (MD/NP)  8/19/2020    Clinical Status of Patient:  Outpatient (Ambulatory)  The patient location is: at home in [facility]  The chief complaint leading to consultation is: below  Visit type: simultaneous audio-visual  Total time spent with patient: 27 minutes  Each patient to whom he or she provides medical services by telemedicine is:  (1) informed of the relationship between the physician and patient and the respective role of any other health care provider with respect to management of the patient; and (2) notified that he or she may decline to receive medical services by telemedicine and may withdraw from such care at any time.      Chief Complaint:  Ramiro Hoffman is a 19 y.o. male who presents today for follow-up of altered social communication and impaired social judgment.    Met with patient and then patient with mother.      Interval History and Content of Current Session:  Interim Events/Subjective Report/Content of Current Session:        Doing about the same: no problems with misconduct, no problems with depression or anger. He and parents decided to decline his enrollment in the [employer] program this semester due to its location being changed to the [facility] [place], requiring a 90 minute bus ride each way with a mixture of high school children- parents were concerned re: Ramiro's safety with the high schoolers (this is an historical problem) and Ramiro was very anxious that he might expose his parents to coronavirus, so together they decided to sit the year out. The decision was made last week.       Ramiro is maintaining social contact with one friend, with whom he visits for meals and watching horror movies. He looks forward to a small backyard [employer] birthday party for his 20st next month. He is eager to assure me about his virus precaution behaviors.     Psychotherapy:  · Target symptoms: distractability, recurrent depression, social adjustment to major  life transition  · Why chosen therapy is appropriate versus another modality: relevant to diagnosis, patient responds to this modality  · Outcome monitoring methods: self-report, observation, teacher report, feedback from family  · Therapeutic intervention type: supportive psychotherapy, interactive psychotherapy  · Topics discussed/themes: relationships difficulties, difficulty managing affect in interpersonal relationships  · The patient's response to the intervention is accepting. The patient's progress toward treatment goals is good.   · Duration of intervention: 20 minutes    Review of Systems   History obtained from the patient.  General ROS: negative for - fatigue and weight loss or weight loss.   Psychological ROS: negative for - depression, hallucinations, hostility, mood swings, physical abuse, sexual abuse or sleep disturbances  Ophthalmic ROS: negative for - decreased vision or dry eyes  ENT ROS: negative for - epistaxis, nasal congestion or oral lesions  Hematological and Lymphatic ROS: negative for - bruising, jaundice or pallor  Endocrine ROS: negative for - galactorrhea, skin changes or temperature intolerance  Respiratory ROS: no cough or shortness of breath  Cardiovascular ROS: no chest pain or tachycardia  Gastrointestinal ROS: no abdominal pain, change in bowel habits  Urinary ROS: no dysuria, trouble voiding or hematuria  Neurological ROS: negative for - headaches, seizures, tremors. +eyeblink tics when he feels anxious but denies noticing any other AIMS  Dermatological ROS: negative for rash    Past Medical, Family and Social History: The patient's past medical, family and social history have been reviewed and updated as appropriate within the electronic medical record - see encounter notes.  Past Medical History:   Diagnosis Date    ADHD (attention deficit hyperactivity disorder)     Autism spectrum disorder 2004    Lactose intolerance      Current Outpatient Medications on File Prior to  Visit   Medication Sig Dispense Refill    amantadine HCl (SYMMETREL) 100 mg capsule Take 1 capsule (100 mg total) by mouth every morning. 90 capsule 1    risperiDONE (RISPERDAL) 2 MG tablet Take 1 tablet (2 mg total) by mouth every evening. 90 tablet 1     No current facility-administered medications on file prior to visit.    Compliance: yes  Side effects: None. Ramiro denies any problems with headache, stomach upset, weight loss, insomnia, chest pain, palpitations, tics, or tremors.    Risk Parameters:  Patient reports no suicidal ideation  Patient reports no homicidal ideation  Patient reports no self-injurious behavior  Patient reports no violent behavior   Ramiro denies any use of alcohol, cannabis, or other drugs.    Exam (detailed: at least 9 elements; comprehensive: all 15 elements)   Constitutional  Vitals:   vitals were not taken for this visit.      General:  age appropriate, casually dressed, acne minimal   Musculoskeletal  Muscle Strength/Tone:  no tremor, no tic.    Gait & Station:  non-ataxic   Psychiatric  Speech:  spontaneous, monotone, conversational volume   Mood & Affect:  euthymic  blunted, mood-congruent   Thought Process:  concrete, with some over-personalized tangents   Associations:  intact   Thought Content:  no suicidality, no homicidality, delusions, or paranoia   Insight:  has awareness of illness, but it is concrete and rudimentary, though he is no longer defensive about it   Judgement: impaired due to autism   Orientation:  person, place, time/date, situation, day of week   Memory: intact for content of interview   Language: awkward prosody, some semantic/pragmatic errors, low vocabulary for age.   Attention Span & Concentration:  able to focus   Fund of Knowledge:  familiar with aspects of current personal life     Assessment and Diagnosis   Status/Progress: Based on the examination today, the patient's problem(s) is/are optimally controlled given his ability.  New problems have  not been presented today. Comorbidities are complicating management of the primary condition.  There are no active rule-out diagnoses for this patient at this time.    General Impression:   1. Autism spectrum disorder, level 2     2. Hyperkinesis of childhood with developmental delay       Intervention/Counseling/Treatment Plan   · Medication Management: continue current medications, risperidone 2 mg qHS and amantadine 100 mg daily  · Outside records/collateral information from additional sources: reviewed collateral reports from his mother  · Counseling provided with family as follows: techniques he continues to appropriately confront and protect himself from bullying behavior by peers, risks and benefits of treatment options, including medications, were discussed with the patient, risk factor reduction, family education  · Risperidone monitoring labs due at next visit    Return to Clinic: 6 months     INTERACTIVE COMPLEXITY:  Expressive communication skills have not developed adequately to explain symptoms and response to treatment, requiring the use of interactive methods and materials to elicit data.

## 2020-10-19 ENCOUNTER — TELEPHONE (OUTPATIENT)
Dept: INTERNAL MEDICINE | Facility: CLINIC | Age: 20
End: 2020-10-19

## 2020-10-19 NOTE — TELEPHONE ENCOUNTER
----- Message from Mora Mccoy sent at 10/19/2020 11:35 AM CDT -----  Contact: [phone]  Appointment Request From: Ramiro Hoffman    With Provider: Scott Hinton MD [[facility]]    Preferred Date Range: Any date 10/19/2020 or later    Preferred Times: Any Time    Reason for visit: Ramiro passed out in shower    Comments:  Address why he passed out. Doing fine today. Happened Saturday night before bed

## 2020-10-20 ENCOUNTER — OFFICE VISIT (OUTPATIENT)
Dept: PSYCHIATRY | Facility: CLINIC | Age: 20
End: 2020-10-20
Payer: COMMERCIAL

## 2020-10-20 DIAGNOSIS — F90.8 HYPERKINESIS OF CHILDHOOD WITH DEVELOPMENTAL DELAY: ICD-10-CM

## 2020-10-20 DIAGNOSIS — F84.0 AUTISM SPECTRUM DISORDER: Primary | ICD-10-CM

## 2020-10-20 DIAGNOSIS — R55 SYNCOPE, UNSPECIFIED SYNCOPE TYPE: ICD-10-CM

## 2020-10-20 PROCEDURE — 99213 OFFICE O/P EST LOW 20 MIN: CPT | Mod: S$GLB,,, | Performed by: PSYCHIATRY & NEUROLOGY

## 2020-10-20 PROCEDURE — 90785 PSYTX COMPLEX INTERACTIVE: CPT | Mod: S$PBB,,, | Performed by: PSYCHIATRY & NEUROLOGY

## 2020-10-20 PROCEDURE — 99999 PR PBB SHADOW E&M-EST. PATIENT-LVL II: ICD-10-PCS | Mod: PBBFAC,,, | Performed by: PSYCHIATRY & NEUROLOGY

## 2020-10-20 PROCEDURE — 90833 PSYTX W PT W E/M 30 MIN: CPT | Mod: S$GLB,,, | Performed by: PSYCHIATRY & NEUROLOGY

## 2020-10-20 PROCEDURE — 99213 PR OFFICE/OUTPT VISIT, EST, LEVL III, 20-29 MIN: ICD-10-PCS | Mod: S$GLB,,, | Performed by: PSYCHIATRY & NEUROLOGY

## 2020-10-20 PROCEDURE — 99999 PR PBB SHADOW E&M-EST. PATIENT-LVL II: CPT | Mod: PBBFAC,,, | Performed by: PSYCHIATRY & NEUROLOGY

## 2020-10-20 PROCEDURE — 90833 PR PSYCHOTHERAPY W/PATIENT W/E&M, 30 MIN (ADD ON): ICD-10-PCS | Mod: S$GLB,,, | Performed by: PSYCHIATRY & NEUROLOGY

## 2020-10-20 PROCEDURE — 90785 PR INTERACTIVE COMPLEXITY: ICD-10-PCS | Mod: S$PBB,,, | Performed by: PSYCHIATRY & NEUROLOGY

## 2020-10-20 PROCEDURE — 99212 OFFICE O/P EST SF 10 MIN: CPT | Mod: PBBFAC | Performed by: PSYCHIATRY & NEUROLOGY

## 2020-10-20 NOTE — PROGRESS NOTES
Outpatient Psychiatry Follow-Up Visit (MD/NP)  10/20/2020    Clinical Status of Patient:  Outpatient (Ambulatory)  The patient location is: at home in [facility]  The chief complaint leading to consultation is: below  Visit type: simultaneous audio-visual  Total time spent with patient: 27 minutes  Each patient to whom he or she provides medical services by telemedicine is:  (1) informed of the relationship between the physician and patient and the respective role of any other health care provider with respect to management of the patient; and (2) notified that he or she may decline to receive medical services by telemedicine and may withdraw from such care at any time.      Chief Complaint:  Ramiro Hoffman is a 20 y.o. male who presents today for follow-up of altered social communication and impaired social judgment.    Met with patient and then patient with mother.      Interval History and Content of Current Session:  Interim Events/Subjective Report/Content of Current Session:        Doing about the same: no problems with misconduct, no problems with depression or anger. He and parents decided to decline his enrollment in the [employer] program this semester due to its location being changed to the [facility], requiring a 90 minute bus ride each way with a mixture of high school children- parents were concerned re: Ramiro's safety with the high schoolers (this is an historical problem) and Ramiro was very anxious that he might expose his parents to coronavirus, so together they decided to sit the year out. The decision was made last week.       Ramiro is maintaining social contact with one friend, with whom he visits for meals and watching horror movies. He looks forward to a small backyard [employer] birthday party for his 20st next month. He is eager to assure me about his virus precaution behaviors.     Psychotherapy:  · Target symptoms: distractability, recurrent depression, social adjustment to major  life transition  · Why chosen therapy is appropriate versus another modality: relevant to diagnosis, patient responds to this modality  · Outcome monitoring methods: self-report, observation, teacher report, feedback from family  · Therapeutic intervention type: supportive psychotherapy, interactive psychotherapy  · Topics discussed/themes: relationships difficulties, difficulty managing affect in interpersonal relationships  · The patient's response to the intervention is accepting. The patient's progress toward treatment goals is good.   · Duration of intervention: 20 minutes    Review of Systems   History obtained from the patient.  General ROS: negative for - fatigue and weight loss or weight loss.   Psychological ROS: negative for - depression, hallucinations, hostility, mood swings, physical abuse, sexual abuse or sleep disturbances  Ophthalmic ROS: negative for - decreased vision or dry eyes  ENT ROS: negative for - epistaxis, nasal congestion or oral lesions  Hematological and Lymphatic ROS: negative for - bruising, jaundice or pallor  Endocrine ROS: negative for - galactorrhea, skin changes or temperature intolerance  Respiratory ROS: no cough or shortness of breath  Cardiovascular ROS: no chest pain or tachycardia  Gastrointestinal ROS: no abdominal pain, change in bowel habits  Urinary ROS: no dysuria, trouble voiding or hematuria  Neurological ROS: negative for - headaches, seizures, tremors. +eyeblink tics when he feels anxious but denies noticing any other AIMS  Dermatological ROS: negative for rash    Past Medical, Family and Social History: The patient's past medical, family and social history have been reviewed and updated as appropriate within the electronic medical record - see encounter notes.  Past Medical History:   Diagnosis Date    ADHD (attention deficit hyperactivity disorder)     Autism spectrum disorder 2004    Lactose intolerance      Current Outpatient Medications on File Prior to  Visit   Medication Sig Dispense Refill    amantadine HCl (SYMMETREL) 100 mg capsule Take 1 capsule (100 mg total) by mouth every morning. 90 capsule 1    risperiDONE (RISPERDAL) 2 MG tablet Take 1 tablet (2 mg total) by mouth every evening. 90 tablet 1     No current facility-administered medications on file prior to visit.    Compliance: yes  Side effects: None. Ramiro denies any problems with headache, stomach upset, weight loss, insomnia, chest pain, palpitations, tics, or tremors.    Risk Parameters:  Patient reports no suicidal ideation  Patient reports no homicidal ideation  Patient reports no self-injurious behavior  Patient reports no violent behavior   Ramiro denies any use of alcohol, cannabis, or other drugs.    Exam (detailed: at least 9 elements; comprehensive: all 15 elements)   Constitutional  Vitals:   vitals were not taken for this visit.      General:  age appropriate, casually dressed, acne minimal   Musculoskeletal  Muscle Strength/Tone:  no tremor, no tic.    Gait & Station:  non-ataxic   Psychiatric  Speech:  spontaneous, monotone, conversational volume   Mood & Affect:  euthymic  blunted, mood-congruent   Thought Process:  concrete, with some over-personalized tangents   Associations:  intact   Thought Content:  no suicidality, no homicidality, delusions, or paranoia   Insight:  has awareness of illness, but it is concrete and rudimentary, though he is no longer defensive about it   Judgement: impaired due to autism   Orientation:  person, place, time/date, situation, day of week   Memory: intact for content of interview   Language: awkward prosody, some semantic/pragmatic errors, low vocabulary for age.   Attention Span & Concentration:  able to focus   Fund of Knowledge:  familiar with aspects of current personal life     Assessment and Diagnosis   Status/Progress: Based on the examination today, the patient's problem(s) is/are optimally controlled given his ability.  New problems have  not been presented today. Comorbidities are complicating management of the primary condition.  There are no active rule-out diagnoses for this patient at this time.    General Impression:   No diagnosis found.  Intervention/Counseling/Treatment Plan   · Medication Management: continue current medications, risperidone 2 mg qHS and amantadine 100 mg daily  · Outside records/collateral information from additional sources: reviewed collateral reports from his mother  · Counseling provided with family as follows: techniques he continues to appropriately confront and protect himself from bullying behavior by peers, risks and benefits of treatment options, including medications, were discussed with the patient, risk factor reduction, family education  · Risperidone monitoring labs due at next visit    Return to Clinic: 1 month     INTERACTIVE COMPLEXITY:  Expressive communication skills have not developed adequately to explain symptoms and response to treatment, requiring the use of interactive methods and materials to elicit data.

## 2020-10-21 ENCOUNTER — OFFICE VISIT (OUTPATIENT)
Dept: INTERNAL MEDICINE | Facility: CLINIC | Age: 20
End: 2020-10-21
Payer: COMMERCIAL

## 2020-10-21 VITALS
HEART RATE: 94 BPM | BODY MASS INDEX: 20.27 KG/M2 | WEIGHT: 144.81 LBS | HEIGHT: 71 IN | DIASTOLIC BLOOD PRESSURE: 82 MMHG | SYSTOLIC BLOOD PRESSURE: 122 MMHG | OXYGEN SATURATION: 98 %

## 2020-10-21 DIAGNOSIS — R55 SYNCOPE, UNSPECIFIED SYNCOPE TYPE: Primary | ICD-10-CM

## 2020-10-21 PROCEDURE — 99214 OFFICE O/P EST MOD 30 MIN: CPT | Mod: S$GLB,,, | Performed by: STUDENT IN AN ORGANIZED HEALTH CARE EDUCATION/TRAINING PROGRAM

## 2020-10-21 PROCEDURE — 93005 ELECTROCARDIOGRAM TRACING: CPT | Mod: S$GLB,,, | Performed by: STUDENT IN AN ORGANIZED HEALTH CARE EDUCATION/TRAINING PROGRAM

## 2020-10-21 PROCEDURE — 99999 PR PBB SHADOW E&M-EST. PATIENT-LVL III: ICD-10-PCS | Mod: PBBFAC,,, | Performed by: STUDENT IN AN ORGANIZED HEALTH CARE EDUCATION/TRAINING PROGRAM

## 2020-10-21 PROCEDURE — 99999 PR PBB SHADOW E&M-EST. PATIENT-LVL III: CPT | Mod: PBBFAC,,, | Performed by: STUDENT IN AN ORGANIZED HEALTH CARE EDUCATION/TRAINING PROGRAM

## 2020-10-21 PROCEDURE — 99214 PR OFFICE/OUTPT VISIT, EST, LEVL IV, 30-39 MIN: ICD-10-PCS | Mod: S$GLB,,, | Performed by: STUDENT IN AN ORGANIZED HEALTH CARE EDUCATION/TRAINING PROGRAM

## 2020-10-21 PROCEDURE — 93010 ELECTROCARDIOGRAM REPORT: CPT | Mod: S$GLB,,, | Performed by: INTERNAL MEDICINE

## 2020-10-21 PROCEDURE — 93010 EKG 12-LEAD: ICD-10-PCS | Mod: S$GLB,,, | Performed by: INTERNAL MEDICINE

## 2020-10-21 PROCEDURE — 93005 EKG 12-LEAD: ICD-10-PCS | Mod: S$GLB,,, | Performed by: STUDENT IN AN ORGANIZED HEALTH CARE EDUCATION/TRAINING PROGRAM

## 2020-10-21 NOTE — PATIENT INSTRUCTIONS
Causes of Syncope  Syncope (fainting) has many causes. Sometimes it is not serious. In other cases, syncope is a sign of a heart problem. But treatment can help    When syncope is not serious  Your healthcare provider may call your problem vasovagal syncope, reflex syncope, or orthostatic hypotension. These types of syncope are generally not serious. They can be caused by:  · Strong feelings, such as anxiety or fear. A nerve signal may briefly change your heart rate and lower your blood pressure too much.  · Standing for too long. Standing may cause blood to pool in your legs. When this happens, your brain may not receive all the blood it needs.  · Standing up too quickly. Your blood pressure may not adjust fast enough to changes in posture and may drop too low. Certain medicines can also cause this problem. Examples of medicines that can cause a drop in blood pressure include diuretics, blood pressure medicines, and medicines for chest pain. Your pharmacist or healthcare provider can discuss these with you.  · Reaction to normal body functions. When you go to the bathroom, have gastrointestinal discomfort, nausea, or pain, your heart may have a natural reflex to slow down and lower blood pressure. This can result in syncope. This may also follow exercise, eating, laughter, weight lifting, or playing musical instruments like the trumpet or trombone.  When heart trouble causes syncope  A heart problem can decrease the amount of oxygen-rich blood that reaches the brain. Heart trouble can be serious and even life threatening if not treated:  · A slow heart rate. Electrical signals tell the chambers of the heart when to pump. But the signals may be slowed or blocked (heart block) as they travel on the hearts electrical pathways. This can be caused by aging, scarred heart tissue, or damage from heart disease. When the heart rate slows, not enough blood is pumped.  · A fast heart rate. Certain problems can make the  heart race. For instance, after a heart attack, also known as acute myocardial infarction, or AMI, abnormal electrical signals may be created. These signals can make the heart suddenly beat very fast. The heart pumps before the chambers can fill with blood. So less blood reaches the brain and other parts of the body. Illegal drugs, certain medicines, heart disease, or an inherited condition can also cause this.  · A heart valve problem. Blood travels through the chambers of the heart as it is pumped. Heart valves open and close to help move blood in the right direction. But a valve may not open or close fully, if its hardened or scarred. As a result, less blood is pumped through the heart to the brain and body. Most often, syncope occurs when a person's aortic valve is critically narrowed and he or she participates in  a strenuous activity.  · A heart muscle problem. Some people develop a thickened heart muscle that blocks blood flow out of the heart to the body. This is called hypertrophic cardiomyopathy. Being dehydrated and having hypertrophic cardiomyopathy can increase the risk for syncope.  Whatever the cause of syncope, it is important to be evaluated by your healthcare provider. You may need to be seen by a cardiologist, neurologist, or an ear, nose, and throat specialist. Do not drive, operate heavy machinery, or participate in activities in which you would be at risk for falls and injury if you have syncope and have not been evaluated.  Date Last Reviewed: 5/1/2016  © 8970-4312 [employer]. [address] [place] [address], [place] [address]. All rights reserved. This information is not intended as a substitute for professional medical care. Always follow your healthcare professional's instructions.

## 2020-10-21 NOTE — PROGRESS NOTES
Ramiro Hoffman  2000        Subjective     Chief Complaint:    History of Present Illness:  Mr. Ramiro Hoffman is a 20 y.o. male with PMHx of autism. Who present to the clinic for syncope. The mother reports that the patient was helping his father with cleaning his of cousin house on Saturday and he go without drinking water for more than 5 hours. She states that at night they heard a sound of something hit the floor and they ran to the patient's bathroom to find his on the floor. They tried to called his and tapping on his face for 10 seconds before he regained his full consciousness. Mother states he experienced one episode when he was in the elementary school but it was attributed to multi medication that he was on  interviewing the patient, he reports that he was sitting in the bathtub and when he stood up he felt dizzy with associated symptoms of palpitation and SOB ( weird sensation in his chest ) then he blacked out and didn't remember anything. He denies sensation of ear fullness or ringing. No jerking movement, tongue biting, bowel or bladder incontinence. Mother reports family hx of heart attack in her father but no other family history of other heart diseases or SCD.      Review of Systems   Constitutional: Negative for chills and fever.   Respiratory: Negative for cough, sputum production and shortness of breath.    Cardiovascular: Negative for chest pain, palpitations and PND.   Gastrointestinal: Negative for abdominal pain, diarrhea, heartburn, nausea and vomiting.   Genitourinary: Negative for frequency and urgency.   Musculoskeletal: Negative for myalgias and neck pain.   Neurological: Negative for dizziness and headaches.   Psychiatric/Behavioral: Negative for depression.       PAST HISTORY:     Past Medical History:   Diagnosis Date    ADHD (attention deficit hyperactivity disorder)     Autism spectrum disorder 2004    Lactose intolerance        No past surgical history on  file.    Family History   Problem Relation Age of Onset    Stroke Mother     Hypertension Mother     Diabetes Mother     Hyperlipidemia Mother     Hypertension Father     Acne Brother     Hypertension Brother     Thyroid disease Maternal Aunt     Diabetes Maternal Aunt     Macular degeneration Maternal Grandmother     Strabismus Neg Hx     Retinal detachment Neg Hx     Glaucoma Neg Hx     Blindness Neg Hx        Social History     Socioeconomic History    Marital status: Single     Spouse name: Not on file    Number of children: Not on file    Years of education: Not on file    Highest education level: Not on file   Occupational History    Not on file   Social Needs    Financial resource strain: Not on file    Food insecurity     Worry: Not on file     Inability: Not on file    Transportation needs     Medical: Not on file     Non-medical: Not on file   Tobacco Use    Smoking status: Never Smoker    Smokeless tobacco: Never Used   Substance and Sexual Activity    Alcohol use: Yes     Comment: occ at family events    Drug use: No    Sexual activity: Never   Lifestyle    Physical activity     Days per week: Not on file     Minutes per session: Not on file    Stress: Not on file   Relationships    Social connections     Talks on phone: Not on file     Gets together: Not on file     Attends Holiness service: Not on file     Active member of club or organization: Not on file     Attends meetings of clubs or organizations: Not on file     Relationship status: Not on file   Other Topics Concern    Patient feels they ought to cut down on drinking/drug use Not Asked    Patient annoyed by others criticizing their drinking/drug use Not Asked    Patient has felt bad or guilty about drinking/drug use Not Asked    Patient has had a drink/used drugs as an eye opener in the AM Not Asked   Social History Narrative    Lives with both parents has 2 older sibs, sister in college and brother at home.  "   He finished middle school with special ed.  Will start 9 th grade at [employer]. Mom is not sure that he is able to learn when in main stream classes. He has an IEP.       MEDICATIONS & ALLERGIES:     Current Outpatient Medications on File Prior to Visit   Medication Sig    amantadine HCL (SYMMETREL) 100 mg capsule Take 1 capsule (100 mg total) by mouth every morning.    risperiDONE (RISPERDAL) 2 MG tablet Take 1 tablet (2 mg total) by mouth every evening.     No current facility-administered medications on file prior to visit.        Review of patient's allergies indicates:  No Known Allergies    OBJECTIVE:     Vital Signs:  Vitals:    10/21/20 1351   BP: 122/82   BP Location: Left arm   Patient Position: Sitting   BP Method: Medium (Manual)   Pulse: 94   SpO2: 98%   Weight: 65.7 kg (144 lb 13.5 oz)   Height: 5' 11" (1.803 m)       Body mass index is 20.2 kg/m².     Physical Exam:  General:  Well developed, well nourished, no acute distress  Head: Normocephalic, atraumatic  Eyes: PERRL, EOMI, clear sclera  Throat: No posterior pharyngeal erythema or exudate, no tonsillar exudate  Neck: supple, normal ROM, no thyromegaly   CVS:  RRR, S1 and S2 normal, no murmurs, rubs, gallops, 2+ peripheral pulses  Resp:  Lungs clear to auscultation, no wheezes, rales, rhonchi  GI:  Abdomen soft, non-tender, non-distended, normoactive bowel sounds  MSK:  No muscle atrophy, cyanosis or peripheral edema   Skin:  No rashes, ulcers, erythema  Neuro:  CNII-XII grossly intact, no focal deficits noted  Psych:  Appropriate mood and affect, normal judgement    Laboratory  Lab Results   Component Value Date    WBC 6.68 12/26/2019    HGB 15.4 12/26/2019    HCT 43.9 12/26/2019    MCV 86 12/26/2019     12/26/2019     Lab Results   Component Value Date    GLU 91 12/26/2019     12/26/2019    K 4.0 12/26/2019     12/26/2019    CO2 28 12/26/2019    BUN 16 12/26/2019    CREATININE 0.9 12/26/2019    CALCIUM 9.7 12/26/2019    " MG 2.2 05/16/2011     No results found for: INR, PROTIME  Lab Results   Component Value Date    HGBA1C 5.3 12/26/2019     No results for input(s): POCTGLUCOSE in the last 72 hours.        ASSESSMENT & PLAN:   Mr. Ramiro Hoffman is a 20 y.o. male with hx of autism present for dizziness. precipitating factor is likely volume depilation. Physical exam unremarkable for orthostatic hypotension and CV exam was unremarkable. EKG in the office was grossly unremarkable.         Syncope, unspecified syncope type  -     IN OFFICE EKG 12-LEAD (to Muse)    -     Instructed to maintain good hydration   -     Call the clinic if any other episodes occur               Germaine Roe MD  Internal Medicine PGY2  [facility]  [address] [place] [address]

## 2020-10-21 NOTE — LETTER
October 21, 2020      [facility]  [address]  [facility] [address]  Phone: [phone]  Fax: [phone]       Patient: Ramiro Hoffman   YOB: 2000  Date of Visit: 10/21/2020    To Whom It May Concern:    Gilberto Hoffman  was at [facility] on 10/21/2020. He may return to work/school on 10/22/2020 with no restrictions. If you have any questions or concerns, or if I can be of further assistance, please do not hesitate to contact me.    Sincerely,    Germaine Roe MD

## 2021-03-27 ENCOUNTER — IMMUNIZATION (OUTPATIENT)
Dept: PRIMARY CARE CLINIC | Facility: CLINIC | Age: 21
End: 2021-03-27
Payer: COMMERCIAL

## 2021-03-27 DIAGNOSIS — Z23 NEED FOR VACCINATION: Primary | ICD-10-CM

## 2021-03-27 PROCEDURE — 0001A PR IMMUNIZ ADMIN, SARS-COV-2 COVID-19 VACC, 30MCG/0.3ML, 1ST DOSE: CPT | Mod: CV19,S$GLB,, | Performed by: INTERNAL MEDICINE

## 2021-03-27 PROCEDURE — 0001A PR IMMUNIZ ADMIN, SARS-COV-2 COVID-19 VACC, 30MCG/0.3ML, 1ST DOSE: ICD-10-PCS | Mod: CV19,S$GLB,, | Performed by: INTERNAL MEDICINE

## 2021-03-27 PROCEDURE — 91300 PR SARS-COV- 2 COVID-19 VACCINE, NO PRSV, 30MCG/0.3ML, IM: ICD-10-PCS | Mod: S$GLB,,, | Performed by: INTERNAL MEDICINE

## 2021-03-27 PROCEDURE — 91300 PR SARS-COV- 2 COVID-19 VACCINE, NO PRSV, 30MCG/0.3ML, IM: CPT | Mod: S$GLB,,, | Performed by: INTERNAL MEDICINE

## 2021-03-27 RX ADMIN — Medication 0.3 ML: at 06:03

## 2021-04-17 ENCOUNTER — IMMUNIZATION (OUTPATIENT)
Dept: PRIMARY CARE CLINIC | Facility: CLINIC | Age: 21
End: 2021-04-17
Payer: MEDICAID

## 2021-04-17 DIAGNOSIS — Z23 NEED FOR VACCINATION: Primary | ICD-10-CM

## 2021-04-17 PROCEDURE — 91300 PR SARS-COV- 2 COVID-19 VACCINE, NO PRSV, 30MCG/0.3ML, IM: CPT | Mod: S$GLB,,, | Performed by: INTERNAL MEDICINE

## 2021-04-17 PROCEDURE — 0002A PR IMMUNIZ ADMIN, SARS-COV-2 COVID-19 VACC, 30MCG/0.3ML, 2ND DOSE: CPT | Mod: CV19,S$GLB,, | Performed by: INTERNAL MEDICINE

## 2021-04-17 PROCEDURE — 0002A PR IMMUNIZ ADMIN, SARS-COV-2 COVID-19 VACC, 30MCG/0.3ML, 2ND DOSE: ICD-10-PCS | Mod: CV19,S$GLB,, | Performed by: INTERNAL MEDICINE

## 2021-04-17 PROCEDURE — 91300 PR SARS-COV- 2 COVID-19 VACCINE, NO PRSV, 30MCG/0.3ML, IM: ICD-10-PCS | Mod: S$GLB,,, | Performed by: INTERNAL MEDICINE

## 2021-04-17 RX ADMIN — Medication 0.3 ML: at 02:04

## 2021-05-11 ENCOUNTER — OFFICE VISIT (OUTPATIENT)
Dept: PSYCHIATRY | Facility: CLINIC | Age: 21
End: 2021-05-11
Payer: MEDICAID

## 2021-05-11 DIAGNOSIS — F90.8 HYPERKINESIS OF CHILDHOOD WITH DEVELOPMENTAL DELAY: ICD-10-CM

## 2021-05-11 DIAGNOSIS — Z30.09 ENCOUNTER FOR VASECTOMY COUNSELING: ICD-10-CM

## 2021-05-11 DIAGNOSIS — F84.0 AUTISM SPECTRUM DISORDER: Primary | ICD-10-CM

## 2021-05-11 PROCEDURE — 99212 OFFICE O/P EST SF 10 MIN: CPT | Mod: PBBFAC | Performed by: PSYCHIATRY & NEUROLOGY

## 2021-05-11 PROCEDURE — 99999 PR PBB SHADOW E&M-EST. PATIENT-LVL II: ICD-10-PCS | Mod: PBBFAC,,, | Performed by: PSYCHIATRY & NEUROLOGY

## 2021-05-11 PROCEDURE — 90846 FAMILY PSYTX W/O PT 50 MIN: CPT | Mod: AF,HB,, | Performed by: PSYCHIATRY & NEUROLOGY

## 2021-05-11 PROCEDURE — 90846 PR FAMILY PSYCHOTHERAPY W/O PT, 50 MIN: ICD-10-PCS | Mod: AF,HB,, | Performed by: PSYCHIATRY & NEUROLOGY

## 2021-05-11 PROCEDURE — 99999 PR PBB SHADOW E&M-EST. PATIENT-LVL II: CPT | Mod: PBBFAC,,, | Performed by: PSYCHIATRY & NEUROLOGY

## 2021-05-11 RX ORDER — RISPERIDONE 2 MG/1
2 TABLET ORAL NIGHTLY
Qty: 30 TABLET | Refills: 5 | Status: SHIPPED | OUTPATIENT
Start: 2021-05-11 | End: 2022-01-11 | Stop reason: SDUPTHER

## 2021-05-11 RX ORDER — AMANTADINE HYDROCHLORIDE 100 MG/1
100 CAPSULE, GELATIN COATED ORAL EVERY MORNING
Qty: 30 CAPSULE | Refills: 5 | Status: SHIPPED | OUTPATIENT
Start: 2021-05-11 | End: 2022-01-11 | Stop reason: SDUPTHER

## 2021-05-17 ENCOUNTER — TELEPHONE (OUTPATIENT)
Dept: UROLOGY | Facility: CLINIC | Age: 21
End: 2021-05-17

## 2021-05-17 ENCOUNTER — OFFICE VISIT (OUTPATIENT)
Dept: UROLOGY | Facility: CLINIC | Age: 21
End: 2021-05-17
Payer: MEDICAID

## 2021-05-17 ENCOUNTER — TELEPHONE (OUTPATIENT)
Dept: INTERNAL MEDICINE | Facility: CLINIC | Age: 21
End: 2021-05-17

## 2021-05-17 VITALS
HEIGHT: 71 IN | HEART RATE: 86 BPM | DIASTOLIC BLOOD PRESSURE: 88 MMHG | SYSTOLIC BLOOD PRESSURE: 135 MMHG | BODY MASS INDEX: 20.21 KG/M2 | WEIGHT: 144.38 LBS

## 2021-05-17 DIAGNOSIS — Z30.09 VASECTOMY EVALUATION: Primary | ICD-10-CM

## 2021-05-17 PROCEDURE — 99204 PR OFFICE/OUTPT VISIT, NEW, LEVL IV, 45-59 MIN: ICD-10-PCS | Mod: S$PBB,,, | Performed by: UROLOGY

## 2021-05-17 PROCEDURE — 99213 OFFICE O/P EST LOW 20 MIN: CPT | Mod: PBBFAC | Performed by: UROLOGY

## 2021-05-17 PROCEDURE — 99204 OFFICE O/P NEW MOD 45 MIN: CPT | Mod: S$PBB,,, | Performed by: UROLOGY

## 2021-05-17 PROCEDURE — 99999 PR PBB SHADOW E&M-EST. PATIENT-LVL III: CPT | Mod: PBBFAC,,, | Performed by: UROLOGY

## 2021-05-17 PROCEDURE — 99999 PR PBB SHADOW E&M-EST. PATIENT-LVL III: ICD-10-PCS | Mod: PBBFAC,,, | Performed by: UROLOGY

## 2021-05-18 ENCOUNTER — LAB VISIT (OUTPATIENT)
Dept: INTERNAL MEDICINE | Facility: CLINIC | Age: 21
End: 2021-05-18
Payer: MEDICAID

## 2021-05-18 DIAGNOSIS — Z30.09 VASECTOMY EVALUATION: ICD-10-CM

## 2021-05-18 LAB — SARS-COV-2 RNA RESP QL NAA+PROBE: NOT DETECTED

## 2021-05-18 PROCEDURE — U0003 INFECTIOUS AGENT DETECTION BY NUCLEIC ACID (DNA OR RNA); SEVERE ACUTE RESPIRATORY SYNDROME CORONAVIRUS 2 (SARS-COV-2) (CORONAVIRUS DISEASE [COVID-19]), AMPLIFIED PROBE TECHNIQUE, MAKING USE OF HIGH THROUGHPUT TECHNOLOGIES AS DESCRIBED BY CMS-2020-01-R: HCPCS | Performed by: UROLOGY

## 2021-05-18 PROCEDURE — U0005 INFEC AGEN DETEC AMPLI PROBE: HCPCS | Performed by: UROLOGY

## 2021-05-20 ENCOUNTER — ANESTHESIA EVENT (OUTPATIENT)
Dept: SURGERY | Facility: HOSPITAL | Age: 21
End: 2021-05-20
Payer: MEDICAID

## 2021-05-20 ENCOUNTER — TELEPHONE (OUTPATIENT)
Dept: UROLOGY | Facility: CLINIC | Age: 21
End: 2021-05-20

## 2021-05-21 ENCOUNTER — ANESTHESIA (OUTPATIENT)
Dept: SURGERY | Facility: HOSPITAL | Age: 21
End: 2021-05-21
Payer: MEDICAID

## 2021-05-21 ENCOUNTER — HOSPITAL ENCOUNTER (OUTPATIENT)
Facility: HOSPITAL | Age: 21
Discharge: HOME | End: 2021-05-21
Attending: UROLOGY | Admitting: UROLOGY
Payer: MEDICAID

## 2021-05-21 VITALS
WEIGHT: 144 LBS | HEIGHT: 71 IN | RESPIRATION RATE: 23 BRPM | DIASTOLIC BLOOD PRESSURE: 72 MMHG | SYSTOLIC BLOOD PRESSURE: 111 MMHG | OXYGEN SATURATION: 99 % | HEART RATE: 77 BPM | BODY MASS INDEX: 20.16 KG/M2 | TEMPERATURE: 97 F

## 2021-05-21 DIAGNOSIS — Z30.2 ADMISSION FOR VASECTOMY: ICD-10-CM

## 2021-05-21 PROCEDURE — 88342 IMHCHEM/IMCYTCHM 1ST ANTB: CPT | Performed by: PATHOLOGY

## 2021-05-21 PROCEDURE — 25000003 PHARM REV CODE 250: Performed by: STUDENT IN AN ORGANIZED HEALTH CARE EDUCATION/TRAINING PROGRAM

## 2021-05-21 PROCEDURE — 25000003 PHARM REV CODE 250: Performed by: UROLOGY

## 2021-05-21 PROCEDURE — 37000009 HC ANESTHESIA EA ADD 15 MINS: Performed by: UROLOGY

## 2021-05-21 PROCEDURE — 88341 IMHCHEM/IMCYTCHM EA ADD ANTB: CPT | Mod: 59 | Performed by: PATHOLOGY

## 2021-05-21 PROCEDURE — 88341 PR IHC OR ICC EACH ADD'L SINGLE ANTIBODY  STAINPR: ICD-10-PCS | Mod: 26,,, | Performed by: PATHOLOGY

## 2021-05-21 PROCEDURE — 63600175 PHARM REV CODE 636 W HCPCS: Performed by: ANESTHESIOLOGY

## 2021-05-21 PROCEDURE — 88341 IMHCHEM/IMCYTCHM EA ADD ANTB: CPT | Mod: 26,,, | Performed by: PATHOLOGY

## 2021-05-21 PROCEDURE — 88302 PR  SURG PATH,LEVEL II: ICD-10-PCS | Mod: 26,,, | Performed by: PATHOLOGY

## 2021-05-21 PROCEDURE — 71000044 HC DOSC ROUTINE RECOVERY FIRST HOUR: Performed by: UROLOGY

## 2021-05-21 PROCEDURE — 00921 ANES PX MALE GENITALIA VASEC: CPT | Performed by: UROLOGY

## 2021-05-21 PROCEDURE — 88342 CHG IMMUNOCYTOCHEMISTRY: ICD-10-PCS | Mod: 26,,, | Performed by: PATHOLOGY

## 2021-05-21 PROCEDURE — 88302 TISSUE EXAM BY PATHOLOGIST: CPT | Performed by: PATHOLOGY

## 2021-05-21 PROCEDURE — 88302 TISSUE EXAM BY PATHOLOGIST: CPT | Mod: 26,,, | Performed by: PATHOLOGY

## 2021-05-21 PROCEDURE — D9220A PRA ANESTHESIA: ICD-10-PCS | Mod: ,,, | Performed by: ANESTHESIOLOGY

## 2021-05-21 PROCEDURE — 55250 PR REMOVAL OF SPERM DUCT(S): ICD-10-PCS | Mod: ,,, | Performed by: UROLOGY

## 2021-05-21 PROCEDURE — 37000008 HC ANESTHESIA 1ST 15 MINUTES: Performed by: UROLOGY

## 2021-05-21 PROCEDURE — 63600175 PHARM REV CODE 636 W HCPCS: Performed by: STUDENT IN AN ORGANIZED HEALTH CARE EDUCATION/TRAINING PROGRAM

## 2021-05-21 PROCEDURE — 55250 REMOVAL OF SPERM DUCT(S): CPT | Mod: ,,, | Performed by: UROLOGY

## 2021-05-21 PROCEDURE — 25000003 PHARM REV CODE 250: Performed by: ANESTHESIOLOGY

## 2021-05-21 PROCEDURE — D9220A PRA ANESTHESIA: Mod: ,,, | Performed by: ANESTHESIOLOGY

## 2021-05-21 PROCEDURE — 36000704 HC OR TIME LEV I 1ST 15 MIN: Performed by: UROLOGY

## 2021-05-21 PROCEDURE — 88342 IMHCHEM/IMCYTCHM 1ST ANTB: CPT | Mod: 26,,, | Performed by: PATHOLOGY

## 2021-05-21 PROCEDURE — 36000705 HC OR TIME LEV I EA ADD 15 MIN: Performed by: UROLOGY

## 2021-05-21 PROCEDURE — 71000015 HC POSTOP RECOV 1ST HR: Performed by: UROLOGY

## 2021-05-21 RX ORDER — PROPOFOL 10 MG/ML
VIAL (ML) INTRAVENOUS CONTINUOUS PRN
Status: DISCONTINUED | OUTPATIENT
Start: 2021-05-21 | End: 2021-05-21

## 2021-05-21 RX ORDER — KETAMINE HCL IN 0.9 % NACL 50 MG/5 ML
SYRINGE (ML) INTRAVENOUS
Status: DISCONTINUED | OUTPATIENT
Start: 2021-05-21 | End: 2021-05-21

## 2021-05-21 RX ORDER — CEFAZOLIN SODIUM 1 G/3ML
2 INJECTION, POWDER, FOR SOLUTION INTRAMUSCULAR; INTRAVENOUS
Status: COMPLETED | OUTPATIENT
Start: 2021-05-21 | End: 2021-05-21

## 2021-05-21 RX ORDER — OXYCODONE AND ACETAMINOPHEN 5; 325 MG/1; MG/1
1 TABLET ORAL EVERY 4 HOURS PRN
Qty: 5 TABLET | Refills: 0 | Status: SHIPPED | OUTPATIENT
Start: 2021-05-21 | End: 2022-06-03

## 2021-05-21 RX ORDER — PROPOFOL 10 MG/ML
VIAL (ML) INTRAVENOUS
Status: DISCONTINUED | OUTPATIENT
Start: 2021-05-21 | End: 2021-05-21

## 2021-05-21 RX ORDER — BUPIVACAINE HYDROCHLORIDE 2.5 MG/ML
INJECTION, SOLUTION EPIDURAL; INFILTRATION; INTRACAUDAL
Status: DISCONTINUED | OUTPATIENT
Start: 2021-05-21 | End: 2021-05-21 | Stop reason: HOSPADM

## 2021-05-21 RX ORDER — DOXYCYCLINE HYCLATE 100 MG
100 TABLET ORAL 2 TIMES DAILY
Qty: 6 TABLET | Refills: 0 | Status: SHIPPED | OUTPATIENT
Start: 2021-05-21 | End: 2021-05-24

## 2021-05-21 RX ORDER — ONDANSETRON 2 MG/ML
INJECTION INTRAMUSCULAR; INTRAVENOUS
Status: DISCONTINUED | OUTPATIENT
Start: 2021-05-21 | End: 2021-05-21

## 2021-05-21 RX ORDER — HYDROCODONE BITARTRATE AND ACETAMINOPHEN 5; 325 MG/1; MG/1
1 TABLET ORAL EVERY 4 HOURS PRN
Status: DISCONTINUED | OUTPATIENT
Start: 2021-05-21 | End: 2021-05-21 | Stop reason: HOSPADM

## 2021-05-21 RX ORDER — MIDAZOLAM HYDROCHLORIDE 1 MG/ML
INJECTION, SOLUTION INTRAMUSCULAR; INTRAVENOUS
Status: DISCONTINUED | OUTPATIENT
Start: 2021-05-21 | End: 2021-05-21

## 2021-05-21 RX ORDER — SODIUM CHLORIDE 0.9 % (FLUSH) 0.9 %
3 SYRINGE (ML) INJECTION
Status: DISCONTINUED | OUTPATIENT
Start: 2021-05-21 | End: 2021-05-21 | Stop reason: HOSPADM

## 2021-05-21 RX ORDER — FENTANYL CITRATE 50 UG/ML
INJECTION, SOLUTION INTRAMUSCULAR; INTRAVENOUS
Status: DISCONTINUED | OUTPATIENT
Start: 2021-05-21 | End: 2021-05-21

## 2021-05-21 RX ADMIN — FENTANYL CITRATE 50 MCG: 50 INJECTION, SOLUTION INTRAMUSCULAR; INTRAVENOUS at 06:05

## 2021-05-21 RX ADMIN — Medication 10 MG: at 07:05

## 2021-05-21 RX ADMIN — MIDAZOLAM 1 MG: 1 INJECTION INTRAMUSCULAR; INTRAVENOUS at 06:05

## 2021-05-21 RX ADMIN — PROPOFOL 150 MCG/KG/MIN: 10 INJECTION, EMULSION INTRAVENOUS at 06:05

## 2021-05-21 RX ADMIN — CEFAZOLIN 2 G: 330 INJECTION, POWDER, FOR SOLUTION INTRAMUSCULAR; INTRAVENOUS at 07:05

## 2021-05-21 RX ADMIN — ONDANSETRON 4 MG: 2 INJECTION INTRAMUSCULAR; INTRAVENOUS at 07:05

## 2021-05-21 RX ADMIN — HYDROCODONE BITARTRATE AND ACETAMINOPHEN 1 TABLET: 5; 325 TABLET ORAL at 08:05

## 2021-05-28 LAB
FINAL PATHOLOGIC DIAGNOSIS: NORMAL
GROSS: NORMAL
Lab: NORMAL

## 2021-06-04 ENCOUNTER — TELEPHONE (OUTPATIENT)
Dept: UROLOGY | Facility: CLINIC | Age: 21
End: 2021-06-04

## 2021-06-08 ENCOUNTER — OFFICE VISIT (OUTPATIENT)
Dept: UROLOGY | Facility: CLINIC | Age: 21
End: 2021-06-08
Payer: MEDICAID

## 2021-06-08 VITALS
WEIGHT: 143.94 LBS | DIASTOLIC BLOOD PRESSURE: 104 MMHG | HEIGHT: 71 IN | BODY MASS INDEX: 20.15 KG/M2 | SYSTOLIC BLOOD PRESSURE: 147 MMHG

## 2021-06-08 DIAGNOSIS — Z98.890 POST-OPERATIVE STATE: Primary | ICD-10-CM

## 2021-06-08 PROCEDURE — 99024 POSTOP FOLLOW-UP VISIT: CPT | Mod: ,,, | Performed by: UROLOGY

## 2021-06-08 PROCEDURE — 99999 PR PBB SHADOW E&M-EST. PATIENT-LVL III: CPT | Mod: PBBFAC,,, | Performed by: UROLOGY

## 2021-06-08 PROCEDURE — 99213 OFFICE O/P EST LOW 20 MIN: CPT | Mod: PBBFAC | Performed by: UROLOGY

## 2021-06-08 PROCEDURE — 99024 PR POST-OP FOLLOW-UP VISIT: ICD-10-PCS | Mod: ,,, | Performed by: UROLOGY

## 2021-06-08 PROCEDURE — 99999 PR PBB SHADOW E&M-EST. PATIENT-LVL III: ICD-10-PCS | Mod: PBBFAC,,, | Performed by: UROLOGY

## 2021-06-29 ENCOUNTER — OFFICE VISIT (OUTPATIENT)
Dept: UROLOGY | Facility: CLINIC | Age: 21
End: 2021-06-29
Payer: MEDICAID

## 2021-06-29 VITALS
SYSTOLIC BLOOD PRESSURE: 121 MMHG | BODY MASS INDEX: 19.85 KG/M2 | HEIGHT: 71 IN | DIASTOLIC BLOOD PRESSURE: 88 MMHG | HEART RATE: 88 BPM | WEIGHT: 141.75 LBS

## 2021-06-29 DIAGNOSIS — Z98.890 POST-OPERATIVE STATE: Primary | ICD-10-CM

## 2021-06-29 PROCEDURE — 99999 PR PBB SHADOW E&M-EST. PATIENT-LVL III: CPT | Mod: PBBFAC,,, | Performed by: UROLOGY

## 2021-06-29 PROCEDURE — 99024 POSTOP FOLLOW-UP VISIT: CPT | Mod: ,,, | Performed by: UROLOGY

## 2021-06-29 PROCEDURE — 99213 OFFICE O/P EST LOW 20 MIN: CPT | Mod: PBBFAC | Performed by: UROLOGY

## 2021-06-29 PROCEDURE — 99024 PR POST-OP FOLLOW-UP VISIT: ICD-10-PCS | Mod: ,,, | Performed by: UROLOGY

## 2021-06-29 PROCEDURE — 99999 PR PBB SHADOW E&M-EST. PATIENT-LVL III: ICD-10-PCS | Mod: PBBFAC,,, | Performed by: UROLOGY

## 2021-07-06 ENCOUNTER — TELEPHONE (OUTPATIENT)
Dept: UROLOGY | Facility: CLINIC | Age: 21
End: 2021-07-06

## 2021-12-16 ENCOUNTER — TELEPHONE (OUTPATIENT)
Dept: PSYCHIATRY | Facility: CLINIC | Age: 21
End: 2021-12-16
Payer: MEDICAID

## 2022-01-11 ENCOUNTER — OFFICE VISIT (OUTPATIENT)
Dept: PSYCHIATRY | Facility: CLINIC | Age: 22
End: 2022-01-11
Payer: MEDICAID

## 2022-01-11 VITALS
HEART RATE: 83 BPM | WEIGHT: 137.13 LBS | SYSTOLIC BLOOD PRESSURE: 129 MMHG | DIASTOLIC BLOOD PRESSURE: 79 MMHG | BODY MASS INDEX: 19.13 KG/M2

## 2022-01-11 DIAGNOSIS — F43.23 ADJUSTMENT DISORDER WITH MIXED ANXIETY AND DEPRESSED MOOD: ICD-10-CM

## 2022-01-11 DIAGNOSIS — F90.8 HYPERKINESIS OF CHILDHOOD WITH DEVELOPMENTAL DELAY: ICD-10-CM

## 2022-01-11 DIAGNOSIS — F84.0 AUTISM SPECTRUM DISORDER: Primary | ICD-10-CM

## 2022-01-11 PROCEDURE — 3008F PR BODY MASS INDEX (BMI) DOCUMENTED: ICD-10-PCS | Mod: CPTII,,, | Performed by: PSYCHIATRY & NEUROLOGY

## 2022-01-11 PROCEDURE — 99212 OFFICE O/P EST SF 10 MIN: CPT | Mod: PBBFAC | Performed by: PSYCHIATRY & NEUROLOGY

## 2022-01-11 PROCEDURE — 3074F PR MOST RECENT SYSTOLIC BLOOD PRESSURE < 130 MM HG: ICD-10-PCS | Mod: CPTII,,, | Performed by: PSYCHIATRY & NEUROLOGY

## 2022-01-11 PROCEDURE — 3078F PR MOST RECENT DIASTOLIC BLOOD PRESSURE < 80 MM HG: ICD-10-PCS | Mod: CPTII,,, | Performed by: PSYCHIATRY & NEUROLOGY

## 2022-01-11 PROCEDURE — 3074F SYST BP LT 130 MM HG: CPT | Mod: CPTII,,, | Performed by: PSYCHIATRY & NEUROLOGY

## 2022-01-11 PROCEDURE — 3078F DIAST BP <80 MM HG: CPT | Mod: CPTII,,, | Performed by: PSYCHIATRY & NEUROLOGY

## 2022-01-11 PROCEDURE — 3008F BODY MASS INDEX DOCD: CPT | Mod: CPTII,,, | Performed by: PSYCHIATRY & NEUROLOGY

## 2022-01-11 PROCEDURE — 99214 PR OFFICE/OUTPT VISIT, EST, LEVL IV, 30-39 MIN: ICD-10-PCS | Mod: S$PBB,AF,HB, | Performed by: PSYCHIATRY & NEUROLOGY

## 2022-01-11 PROCEDURE — 99999 PR PBB SHADOW E&M-EST. PATIENT-LVL II: ICD-10-PCS | Mod: PBBFAC,,, | Performed by: PSYCHIATRY & NEUROLOGY

## 2022-01-11 PROCEDURE — 99214 OFFICE O/P EST MOD 30 MIN: CPT | Mod: S$PBB,AF,HB, | Performed by: PSYCHIATRY & NEUROLOGY

## 2022-01-11 PROCEDURE — 99999 PR PBB SHADOW E&M-EST. PATIENT-LVL II: CPT | Mod: PBBFAC,,, | Performed by: PSYCHIATRY & NEUROLOGY

## 2022-01-11 RX ORDER — ESCITALOPRAM OXALATE 5 MG/1
5 TABLET ORAL DAILY
Qty: 30 TABLET | Refills: 1 | Status: SHIPPED | OUTPATIENT
Start: 2022-01-11 | End: 2022-02-01 | Stop reason: DRUGHIGH

## 2022-01-11 RX ORDER — RISPERIDONE 2 MG/1
2 TABLET ORAL NIGHTLY
Qty: 30 TABLET | Refills: 5 | Status: SHIPPED | OUTPATIENT
Start: 2022-01-11 | End: 2022-08-08

## 2022-01-11 RX ORDER — AMANTADINE HYDROCHLORIDE 100 MG/1
100 CAPSULE, GELATIN COATED ORAL EVERY MORNING
Qty: 30 CAPSULE | Refills: 5 | Status: SHIPPED | OUTPATIENT
Start: 2022-01-11 | End: 2022-08-08

## 2022-01-11 NOTE — PROGRESS NOTES
Outpatient Psychiatry Follow-Up Visit (MD/NP)  1/11/2022    Clinical Status of Patient:  Outpatient (Ambulatory)    Chief Complaint:  Ramiro Hoffman is a 21 y.o. male who presents today for follow-up of altered social communication and impaired social judgment.    Met with patient.      Interval History and Content of Current Session:  Interim Events/Subjective Report/Content of Current Session:        Doing okay over all, but anxious today about a recent event with his GF of 2.5 years       Ramiro is still maintaining social contact with one friend, with whom he visits for meals and watching horror movies.    Review of Systems   History obtained from the patient.  General ROS: negative for - fatigue and weight loss or weight loss.   Psychological ROS: negative for - depression, hallucinations, hostility, mood swings, physical abuse, sexual abuse or sleep disturbances  Ophthalmic ROS: negative for - decreased vision or dry eyes  ENT ROS: negative for - epistaxis, nasal congestion or oral lesions  Hematological and Lymphatic ROS: negative for - bruising, jaundice or pallor  Endocrine ROS: negative for - galactorrhea, skin changes or temperature intolerance  Respiratory ROS: no cough or shortness of breath  Cardiovascular ROS: no chest pain or tachycardia  Gastrointestinal ROS: no abdominal pain, change in bowel habits  Urinary ROS: no dysuria, trouble voiding or hematuria  Neurological ROS: negative for - headaches, seizures, tremors. Eyeblink tics no longer happening  Dermatological ROS: negative for rash    Past Medical, Family and Social History: The patient's past medical, family and social history have been reviewed and updated as appropriate within the electronic medical record - see encounter notes.  Past Medical History:   Diagnosis Date    ADHD (attention deficit hyperactivity disorder)     Autism spectrum disorder 2004    Lactose intolerance      Current Outpatient Medications on File Prior to Visit    Medication Sig Dispense Refill    amantadine HCl (SYMMETREL) 100 mg capsule Take 1 capsule (100 mg total) by mouth every morning. 90 capsule 1    risperiDONE (RISPERDAL) 2 MG tablet Take 1 tablet (2 mg total) by mouth every evening. 90 tablet 1     No current facility-administered medications on file prior to visit.    Compliance: yes  Side effects: None. Ramiro denies any problems with headache, stomach upset, weight loss, insomnia, chest pain, palpitations, tics, or tremors.    Risk Parameters:  Patient reports no suicidal ideation  Patient reports no homicidal ideation  Patient reports no self-injurious behavior  Patient reports no violent behavior   Ramiro denies any use of alcohol, cannabis, or other drugs.    Exam (detailed: at least 9 elements; comprehensive: all 15 elements)   Constitutional  Vitals:   weight is 62.2 kg (137 lb 2 oz). His blood pressure is 129/79 and his pulse is 83.      General:  age appropriate, casually dressed   Musculoskeletal  Muscle Strength/Tone:  no tremor, no tic.    Gait & Station:  non-ataxic   Psychiatric  Speech:  spontaneous, monotone, conversational volume   Mood & Affect:  anxious  blunted, mood-congruent   Thought Process:  concrete, with some over-personalized tangents   Associations:  intact   Thought Content:  no suicidality, no homicidality, delusions, or paranoia   Insight:  has awareness of illness, but it is concrete and rudimentary, though he is no longer defensive about it   Judgement: impaired due to autism   Orientation:  person, place, time/date, situation, day of week   Memory: intact for content of interview   Language: awkward prosody, some semantic/pragmatic errors, low vocabulary for age.   Attention Span & Concentration:  able to focus   Fund of Knowledge:  familiar with aspects of current personal life     Assessment and Diagnosis   Status/Progress: Based on the examination today, the patient's problem(s) is/are optimally controlled given his  ability.  New problems have not been presented today. Comorbidities are complicating management of the primary condition.  There are no active rule-out diagnoses for this patient at this time.    General Impression:   1. Autism spectrum disorder, level 2     2. Hyperkinesis of childhood with developmental delay       Intervention/Counseling/Treatment Plan   · Medication Management: continue current medications, risperidone 2 mg qHS and amantadine 100 mg daily  · Outside records/collateral information from additional sources: reviewed collateral reports from his mother  · Counseling provided with family as follows: techniques he continues to appropriately confront and protect himself from bullying behavior by peers, risks and benefits of treatment options, including medications, were discussed with the patient, risk factor reduction, family education  · Risperidone monitoring labs due at next visit    Return to Clinic: 1 month

## 2022-02-01 ENCOUNTER — OFFICE VISIT (OUTPATIENT)
Dept: PSYCHIATRY | Facility: CLINIC | Age: 22
End: 2022-02-01
Payer: MEDICAID

## 2022-02-01 VITALS
DIASTOLIC BLOOD PRESSURE: 80 MMHG | HEART RATE: 86 BPM | BODY MASS INDEX: 18.4 KG/M2 | WEIGHT: 131.94 LBS | SYSTOLIC BLOOD PRESSURE: 127 MMHG

## 2022-02-01 DIAGNOSIS — F90.8 HYPERKINESIS OF CHILDHOOD WITH DEVELOPMENTAL DELAY: ICD-10-CM

## 2022-02-01 DIAGNOSIS — F43.23 ADJUSTMENT DISORDER WITH MIXED ANXIETY AND DEPRESSED MOOD: ICD-10-CM

## 2022-02-01 DIAGNOSIS — F84.0 AUTISM SPECTRUM DISORDER: Primary | ICD-10-CM

## 2022-02-01 DIAGNOSIS — Z79.899 ENCOUNTER FOR LONG-TERM (CURRENT) USE OF OTHER MEDICATIONS: ICD-10-CM

## 2022-02-01 PROCEDURE — 3074F SYST BP LT 130 MM HG: CPT | Mod: CPTII,,, | Performed by: PSYCHIATRY & NEUROLOGY

## 2022-02-01 PROCEDURE — 1160F PR REVIEW ALL MEDS BY PRESCRIBER/CLIN PHARMACIST DOCUMENTED: ICD-10-PCS | Mod: CPTII,,, | Performed by: PSYCHIATRY & NEUROLOGY

## 2022-02-01 PROCEDURE — 3008F BODY MASS INDEX DOCD: CPT | Mod: CPTII,,, | Performed by: PSYCHIATRY & NEUROLOGY

## 2022-02-01 PROCEDURE — 3074F PR MOST RECENT SYSTOLIC BLOOD PRESSURE < 130 MM HG: ICD-10-PCS | Mod: CPTII,,, | Performed by: PSYCHIATRY & NEUROLOGY

## 2022-02-01 PROCEDURE — 3079F DIAST BP 80-89 MM HG: CPT | Mod: CPTII,,, | Performed by: PSYCHIATRY & NEUROLOGY

## 2022-02-01 PROCEDURE — 1160F RVW MEDS BY RX/DR IN RCRD: CPT | Mod: CPTII,,, | Performed by: PSYCHIATRY & NEUROLOGY

## 2022-02-01 PROCEDURE — 99999 PR PBB SHADOW E&M-EST. PATIENT-LVL III: CPT | Mod: PBBFAC,,, | Performed by: PSYCHIATRY & NEUROLOGY

## 2022-02-01 PROCEDURE — 99999 PR PBB SHADOW E&M-EST. PATIENT-LVL III: ICD-10-PCS | Mod: PBBFAC,,, | Performed by: PSYCHIATRY & NEUROLOGY

## 2022-02-01 PROCEDURE — 3079F PR MOST RECENT DIASTOLIC BLOOD PRESSURE 80-89 MM HG: ICD-10-PCS | Mod: CPTII,,, | Performed by: PSYCHIATRY & NEUROLOGY

## 2022-02-01 PROCEDURE — 99214 OFFICE O/P EST MOD 30 MIN: CPT | Mod: AF,HB,S$PBB, | Performed by: PSYCHIATRY & NEUROLOGY

## 2022-02-01 PROCEDURE — 1159F MED LIST DOCD IN RCRD: CPT | Mod: CPTII,,, | Performed by: PSYCHIATRY & NEUROLOGY

## 2022-02-01 PROCEDURE — 99213 OFFICE O/P EST LOW 20 MIN: CPT | Mod: PBBFAC | Performed by: PSYCHIATRY & NEUROLOGY

## 2022-02-01 PROCEDURE — 3008F PR BODY MASS INDEX (BMI) DOCUMENTED: ICD-10-PCS | Mod: CPTII,,, | Performed by: PSYCHIATRY & NEUROLOGY

## 2022-02-01 PROCEDURE — 99214 PR OFFICE/OUTPT VISIT, EST, LEVL IV, 30-39 MIN: ICD-10-PCS | Mod: AF,HB,S$PBB, | Performed by: PSYCHIATRY & NEUROLOGY

## 2022-02-01 PROCEDURE — 1159F PR MEDICATION LIST DOCUMENTED IN MEDICAL RECORD: ICD-10-PCS | Mod: CPTII,,, | Performed by: PSYCHIATRY & NEUROLOGY

## 2022-02-01 RX ORDER — ESCITALOPRAM OXALATE 10 MG/1
10 TABLET ORAL DAILY
Qty: 30 TABLET | Refills: 1 | Status: SHIPPED | OUTPATIENT
Start: 2022-02-01 | End: 2022-06-03

## 2022-02-01 NOTE — PROGRESS NOTES
Outpatient Psychiatry Follow-Up Visit (MD/NP)  2/1/2022    Clinical Status of Patient:  Outpatient (Ambulatory)    Chief Complaint:  Ramiro Hoffman is a 21 y.o. male who presents today for follow-up of altered social communication and impaired social judgment.    Met with patient.      Interval History and Content of Current Session:  Interim Events/Subjective Report/Content of Current Session:        Doing okay over all, but anxious today about a recent event with his GF of 2.5 years       Ramiro is still maintaining social contact with one friend, with whom he visits for meals and watching horror movies.    Review of Systems   History obtained from the patient.  General ROS: negative for - fatigue and weight loss or weight loss.   Psychological ROS: negative for - depression, hallucinations, hostility, mood swings, physical abuse, sexual abuse or sleep disturbances  Ophthalmic ROS: negative for - decreased vision or dry eyes  ENT ROS: negative for - epistaxis, nasal congestion or oral lesions  Hematological and Lymphatic ROS: negative for - bruising, jaundice or pallor  Endocrine ROS: negative for - galactorrhea, skin changes or temperature intolerance  Respiratory ROS: no cough or shortness of breath  Cardiovascular ROS: no chest pain or tachycardia  Gastrointestinal ROS: no abdominal pain, change in bowel habits  Urinary ROS: no dysuria, trouble voiding or hematuria  Neurological ROS: negative for - headaches, seizures, tremors. Eyeblink tics no longer happening  Dermatological ROS: negative for rash    Past Medical, Family and Social History: The patient's past medical, family and social history have been reviewed and updated as appropriate within the electronic medical record - see encounter notes.  Past Medical History:   Diagnosis Date    ADHD (attention deficit hyperactivity disorder)     Autism spectrum disorder 2004    Lactose intolerance      Current Outpatient Medications on File Prior to Visit    Medication Sig Dispense Refill    amantadine HCL (SYMMETREL) 100 mg capsule Take 1 capsule (100 mg total) by mouth every morning. 30 capsule 5    EScitalopram oxalate (LEXAPRO) 5 MG Tab Take 1 tablet (5 mg total) by mouth once daily. 30 tablet 1    oxyCODONE-acetaminophen (PERCOCET) 5-325 mg per tablet Take 1 tablet by mouth every 4 (four) hours as needed. (Patient not taking: Reported on 6/8/2021) 5 tablet 0    risperiDONE (RISPERDAL) 2 MG tablet Take 1 tablet (2 mg total) by mouth every evening. 30 tablet 5     No current facility-administered medications on file prior to visit.     Compliance: yes  Side effects: None. Ramiro denies any problems with headache, stomach upset, weight loss, insomnia, chest pain, palpitations, tics, or tremors.    Risk Parameters:  Patient reports no suicidal ideation  Patient reports no homicidal ideation  Patient reports no self-injurious behavior  Patient reports no violent behavior   Ramiro denies any use of alcohol, cannabis, or other drugs.    Exam (detailed: at least 9 elements; comprehensive: all 15 elements)   Constitutional  Vitals:   weight is 59.8 kg (131 lb 15.1 oz). His blood pressure is 127/80 and his pulse is 86.      General:  age appropriate, casually dressed   Musculoskeletal  Muscle Strength/Tone:  no tremor, no tic.    Gait & Station:  non-ataxic   Psychiatric  Speech:  spontaneous, monotone, conversational volume, large quantity today   Mood & Affect:  anxious  blunted, mood-congruent   Thought Process:  concrete, with some over-personalized tangents   Associations:  intact   Thought Content:  no suicidality, no homicidality, delusions, or paranoia   Insight:  has awareness of illness, but it is concrete and rudimentary, though he is no longer defensive about it   Judgement: impaired social understanding   Orientation:  person, place, time/date, situation, day of week   Memory: intact for content of interview   Language: awkward prosody, some  semantic/pragmatic errors, low vocabulary for age.   Attention Span & Concentration:  able to focus   Fund of Knowledge:  familiar with aspects of current personal life     Assessment and Diagnosis   Status/Progress: Based on the examination today, the patient's problem(s) is/are optimally controlled given his ability.  New problems have been presented tat last visit and are not much improved today. Comorbidities are complicating management of the primary condition.  There are no active rule-out diagnoses for this patient at this time.    General Impression:   1. Autism spectrum disorder, level 2     2. Adjustment disorder with mixed anxiety and depressed mood     3. Hyperkinesis of childhood with developmental delay       Intervention/Counseling/Treatment Plan   · Medication Management:   1. Increase escitalopram to 10 mg daily  2. Continue risperidone 2 mg qHS   3. Continue amantadine 100 mg daily  · Outside records/collateral information from additional sources: reviewed collateral reports from his mother  · Counseling provided with family as follows: techniques he continues to appropriately confront and protect himself from bullying behavior by peers, risks and benefits of treatment options, including medications, were discussed with the patient, risk factor reduction, family education  · Risperidone monitoring labs due at next visit    Return to Clinic: 3 weeks

## 2022-02-01 NOTE — PATIENT INSTRUCTIONS
INcrease escitalopram to 10 mg daily- two of the 5 mg tabs until they are gone, and then use the new 10 mg tabs

## 2022-02-08 NOTE — PROGRESS NOTES
Discussed condition and exacerbating conditions/situations (e.g., dry/arid environments, overhead fans, air conditioners, side effect of medications). Patient examined, record reviewed, agree with findings and recommendations as documented above.

## 2022-03-16 ENCOUNTER — PATIENT MESSAGE (OUTPATIENT)
Dept: ADMINISTRATIVE | Facility: HOSPITAL | Age: 22
End: 2022-03-16
Payer: MEDICAID

## 2022-04-08 ENCOUNTER — TELEPHONE (OUTPATIENT)
Dept: INTERNAL MEDICINE | Facility: CLINIC | Age: 22
End: 2022-04-08
Payer: MEDICAID

## 2022-04-08 NOTE — TELEPHONE ENCOUNTER
----- Message from Airam Rivera sent at 4/8/2022  1:07 PM CDT -----  Contact: [phone]  Doctor appointment and lab have been scheduled.  Please link lab orders to the lab appointment.  Date of doctor appointment:  5/30/22  Date of lab appointment:  5/26/22  Physical or F/U: Annual  Comments:

## 2022-04-08 NOTE — TELEPHONE ENCOUNTER
Hi, I do not think he needs any lab work.  Let me know if patient or his parent has any questions.  Thank you, Scott Hinton

## 2022-06-03 ENCOUNTER — OFFICE VISIT (OUTPATIENT)
Dept: INTERNAL MEDICINE | Facility: CLINIC | Age: 22
End: 2022-06-03
Payer: MEDICAID

## 2022-06-03 VITALS
HEIGHT: 71 IN | SYSTOLIC BLOOD PRESSURE: 103 MMHG | WEIGHT: 134.5 LBS | BODY MASS INDEX: 18.83 KG/M2 | HEART RATE: 80 BPM | OXYGEN SATURATION: 99 % | RESPIRATION RATE: 18 BRPM | DIASTOLIC BLOOD PRESSURE: 80 MMHG

## 2022-06-03 DIAGNOSIS — Z00.00 ROUTINE GENERAL MEDICAL EXAMINATION AT A HEALTH CARE FACILITY: Primary | ICD-10-CM

## 2022-06-03 DIAGNOSIS — H53.8 BLURRED VISION, BILATERAL: ICD-10-CM

## 2022-06-03 PROCEDURE — 99395 PR PREVENTIVE VISIT,EST,18-39: ICD-10-PCS | Mod: S$PBB,,, | Performed by: INTERNAL MEDICINE

## 2022-06-03 PROCEDURE — 1159F MED LIST DOCD IN RCRD: CPT | Mod: CPTII,,, | Performed by: INTERNAL MEDICINE

## 2022-06-03 PROCEDURE — 3008F BODY MASS INDEX DOCD: CPT | Mod: CPTII,,, | Performed by: INTERNAL MEDICINE

## 2022-06-03 PROCEDURE — 3008F PR BODY MASS INDEX (BMI) DOCUMENTED: ICD-10-PCS | Mod: CPTII,,, | Performed by: INTERNAL MEDICINE

## 2022-06-03 PROCEDURE — 99999 PR PBB SHADOW E&M-EST. PATIENT-LVL IV: CPT | Mod: PBBFAC,,, | Performed by: INTERNAL MEDICINE

## 2022-06-03 PROCEDURE — 99395 PREV VISIT EST AGE 18-39: CPT | Mod: S$PBB,,, | Performed by: INTERNAL MEDICINE

## 2022-06-03 PROCEDURE — 99214 OFFICE O/P EST MOD 30 MIN: CPT | Mod: PBBFAC | Performed by: INTERNAL MEDICINE

## 2022-06-03 PROCEDURE — 1160F PR REVIEW ALL MEDS BY PRESCRIBER/CLIN PHARMACIST DOCUMENTED: ICD-10-PCS | Mod: CPTII,,, | Performed by: INTERNAL MEDICINE

## 2022-06-03 PROCEDURE — 1159F PR MEDICATION LIST DOCUMENTED IN MEDICAL RECORD: ICD-10-PCS | Mod: CPTII,,, | Performed by: INTERNAL MEDICINE

## 2022-06-03 PROCEDURE — 99999 PR PBB SHADOW E&M-EST. PATIENT-LVL IV: ICD-10-PCS | Mod: PBBFAC,,, | Performed by: INTERNAL MEDICINE

## 2022-06-03 PROCEDURE — 3079F PR MOST RECENT DIASTOLIC BLOOD PRESSURE 80-89 MM HG: ICD-10-PCS | Mod: CPTII,,, | Performed by: INTERNAL MEDICINE

## 2022-06-03 PROCEDURE — 3074F PR MOST RECENT SYSTOLIC BLOOD PRESSURE < 130 MM HG: ICD-10-PCS | Mod: CPTII,,, | Performed by: INTERNAL MEDICINE

## 2022-06-03 PROCEDURE — 1160F RVW MEDS BY RX/DR IN RCRD: CPT | Mod: CPTII,,, | Performed by: INTERNAL MEDICINE

## 2022-06-03 PROCEDURE — 3079F DIAST BP 80-89 MM HG: CPT | Mod: CPTII,,, | Performed by: INTERNAL MEDICINE

## 2022-06-03 PROCEDURE — 3074F SYST BP LT 130 MM HG: CPT | Mod: CPTII,,, | Performed by: INTERNAL MEDICINE

## 2022-06-03 NOTE — PROGRESS NOTES
Subjective:       Patient ID: Ramiro Hoffman is a 21 y.o. male.    Chief Complaint: Annual Exam    Here for annual exam    Wonders abt his vision and his wt.    Has good appetite, nml bowels. No actual wt loss.    No new other health issues.    Review of Systems   Constitutional: Negative for appetite change and unexpected weight change.   Respiratory: Negative for chest tightness and shortness of breath.    Cardiovascular: Negative for chest pain.   Gastrointestinal: Negative for abdominal pain.   Genitourinary: Negative for difficulty urinating, scrotal swelling and testicular pain.   Skin:        No lesions   Psychiatric/Behavioral: The patient is nervous/anxious.            Objective:      Physical Exam  Vitals reviewed.   Constitutional:       General: He is not in acute distress.     Appearance: Normal appearance. He is well-developed. He is not ill-appearing, toxic-appearing or diaphoretic.   HENT:      Head: Normocephalic and atraumatic.   Eyes:      General: No scleral icterus.  Neck:      Thyroid: No thyromegaly.   Cardiovascular:      Rate and Rhythm: Normal rate and regular rhythm.      Heart sounds: Normal heart sounds. No murmur heard.    No friction rub. No gallop.   Pulmonary:      Effort: Pulmonary effort is normal. No respiratory distress.      Breath sounds: Normal breath sounds. No wheezing or rales.   Abdominal:      General: Bowel sounds are normal. There is no distension.      Palpations: Abdomen is soft. There is no mass.      Tenderness: There is no abdominal tenderness. There is no guarding or rebound.   Musculoskeletal:         General: Normal range of motion.      Cervical back: Normal range of motion.   Lymphadenopathy:      Cervical: No cervical adenopathy.   Skin:     Findings: No lesion.   Neurological:      Mental Status: He is alert and oriented to person, place, and time.   Psychiatric:         Mood and Affect: Mood normal.         Behavior: Behavior normal.         Thought  Content: Thought content normal.         Assessment:       1. Routine general medical examination at a health care facility    2. Blurred vision, bilateral        Plan:       Ramiro was seen today for annual exam.    Diagnoses and all orders for this visit:    Routine general medical examination at a health care facility  healthy    Blurred vision, bilateral  -     Ambulatory referral/consult to Optometry; Future    Autism/anxiety -- see back Dr Wan    Health Maintenance       Date Due Completion Date    Hepatitis C Screening Never done ---    HIV Screening Never done ---    TETANUS VACCINE 09/07/2020 9/7/2010          Follow up in about 1 year (around 6/3/2023) for Tdap vaccine please.    No future appointments.

## 2022-06-28 ENCOUNTER — TELEPHONE (OUTPATIENT)
Dept: INTERNAL MEDICINE | Facility: CLINIC | Age: 22
End: 2022-06-28
Payer: MEDICAID

## 2022-06-28 NOTE — TELEPHONE ENCOUNTER
Spoke with patient's father regarding covid-19 diagnosis, he said that everyone has had it in the house and is there anything that can be sent to pharmacy. I advised the father to treat the symptoms and I will send message to .-Camila DELACRUZ MA

## 2022-06-28 NOTE — TELEPHONE ENCOUNTER
----- Message from Airam Rivera sent at 6/28/2022 11:11 AM CDT -----  Contact: [phone]  [email or web address]     Patient is calling for Medical Advice regarding: positive covid-19,  sore throat, cough , headache    How long has patient had these symptoms: x 2days    Pharmacy name and phone#:  [facility]/pharmacy #[id number] - [place] - [address]  [address] [facility] [place] [address]  Phone: [phone] Fax: [phone]            Would like response via [employer]:  call    Comments:

## 2022-07-12 ENCOUNTER — TELEPHONE (OUTPATIENT)
Dept: INTERNAL MEDICINE | Facility: CLINIC | Age: 22
End: 2022-07-12
Payer: MEDICAID

## 2022-07-12 ENCOUNTER — OFFICE VISIT (OUTPATIENT)
Dept: URGENT CARE | Facility: CLINIC | Age: 22
End: 2022-07-12
Payer: MEDICAID

## 2022-07-12 ENCOUNTER — NURSE TRIAGE (OUTPATIENT)
Dept: ADMINISTRATIVE | Facility: CLINIC | Age: 22
End: 2022-07-12
Payer: MEDICAID

## 2022-07-12 VITALS
TEMPERATURE: 98 F | HEIGHT: 71 IN | RESPIRATION RATE: 18 BRPM | SYSTOLIC BLOOD PRESSURE: 124 MMHG | HEART RATE: 79 BPM | BODY MASS INDEX: 18.34 KG/M2 | OXYGEN SATURATION: 99 % | WEIGHT: 131 LBS | DIASTOLIC BLOOD PRESSURE: 79 MMHG

## 2022-07-12 DIAGNOSIS — R22.0 LIP SWELLING: ICD-10-CM

## 2022-07-12 DIAGNOSIS — Z91.038 ALLERGY TO ANT BITE: ICD-10-CM

## 2022-07-12 DIAGNOSIS — L50.9 URTICARIA: Primary | ICD-10-CM

## 2022-07-12 PROCEDURE — 1159F MED LIST DOCD IN RCRD: CPT | Mod: CPTII,S$GLB,, | Performed by: INTERNAL MEDICINE

## 2022-07-12 PROCEDURE — 3078F DIAST BP <80 MM HG: CPT | Mod: CPTII,S$GLB,, | Performed by: INTERNAL MEDICINE

## 2022-07-12 PROCEDURE — 1159F PR MEDICATION LIST DOCUMENTED IN MEDICAL RECORD: ICD-10-PCS | Mod: CPTII,S$GLB,, | Performed by: INTERNAL MEDICINE

## 2022-07-12 PROCEDURE — 3008F PR BODY MASS INDEX (BMI) DOCUMENTED: ICD-10-PCS | Mod: CPTII,S$GLB,, | Performed by: INTERNAL MEDICINE

## 2022-07-12 PROCEDURE — 3078F PR MOST RECENT DIASTOLIC BLOOD PRESSURE < 80 MM HG: ICD-10-PCS | Mod: CPTII,S$GLB,, | Performed by: INTERNAL MEDICINE

## 2022-07-12 PROCEDURE — 3074F SYST BP LT 130 MM HG: CPT | Mod: CPTII,S$GLB,, | Performed by: INTERNAL MEDICINE

## 2022-07-12 PROCEDURE — 3074F PR MOST RECENT SYSTOLIC BLOOD PRESSURE < 130 MM HG: ICD-10-PCS | Mod: CPTII,S$GLB,, | Performed by: INTERNAL MEDICINE

## 2022-07-12 PROCEDURE — 3008F BODY MASS INDEX DOCD: CPT | Mod: CPTII,S$GLB,, | Performed by: INTERNAL MEDICINE

## 2022-07-12 PROCEDURE — 99215 OFFICE O/P EST HI 40 MIN: CPT | Mod: S$GLB,,, | Performed by: INTERNAL MEDICINE

## 2022-07-12 PROCEDURE — 99215 PR OFFICE/OUTPT VISIT, EST, LEVL V, 40-54 MIN: ICD-10-PCS | Mod: S$GLB,,, | Performed by: INTERNAL MEDICINE

## 2022-07-12 RX ORDER — TRIAMCINOLONE ACETONIDE 5 MG/G
CREAM TOPICAL 2 TIMES DAILY
Qty: 15 G | Refills: 0 | Status: SHIPPED | OUTPATIENT
Start: 2022-07-12

## 2022-07-12 RX ORDER — CETIRIZINE HYDROCHLORIDE 10 MG/1
10 TABLET ORAL 2 TIMES DAILY
Qty: 20 TABLET | Refills: 0 | Status: SHIPPED | OUTPATIENT
Start: 2022-07-12 | End: 2022-07-22

## 2022-07-12 RX ORDER — DIPHENHYDRAMINE HCL 25 MG
25 CAPSULE ORAL
Status: COMPLETED | OUTPATIENT
Start: 2022-07-12 | End: 2022-07-12

## 2022-07-12 RX ORDER — EPINEPHRINE 0.3 MG/.3ML
1 INJECTION SUBCUTANEOUS ONCE AS NEEDED
Qty: 0.3 ML | Refills: 0 | Status: SHIPPED | OUTPATIENT
Start: 2022-07-12 | End: 2022-07-12

## 2022-07-12 RX ORDER — METHYLPREDNISOLONE 4 MG/1
TABLET ORAL
Qty: 1 EACH | Refills: 0 | Status: SHIPPED | OUTPATIENT
Start: 2022-07-12 | End: 2022-07-12

## 2022-07-12 RX ADMIN — Medication 25 MG: at 05:07

## 2022-07-12 NOTE — PROGRESS NOTES
"Subjective:       Patient ID: Ramiro Hoffman is a 21 y.o. male.    Vitals:  height is 5' 11" (1.803 m) and weight is 59.4 kg (131 lb). His temperature is 96.8 °F (36 °C). His blood pressure is 128/84 and his pulse is 104. His respiration is 18 and oxygen saturation is 97%.     Chief Complaint: Rash    Pt is coming in today with a rash/hives that started 3 days ago. Pt states he cut the grass on Friday and unsure if he came in contact with something. Pt states the rash is spreading. calamine lotion  and benadryl taken mild relief.     Rash  This is a new problem. The current episode started yesterday. The problem is unchanged. The affected locations include the back, chest, left upper leg, left lower leg, right upper leg, right lower leg, right arm and left arm. The rash is characterized by burning, itchiness and pain. It is unknown if there was an exposure to a precipitant. Past treatments include anti-itch cream. The treatment provided mild relief.       Skin: Positive for rash. Negative for erythema.       Objective:      Physical Exam   Constitutional: He is oriented to person, place, and time. He appears well-developed.  Non-toxic appearance. He does not appear ill. No distress.   HENT:   Head: Normocephalic and atraumatic.   Ears:   Right Ear: External ear normal.   Left Ear: External ear normal.   Nose: Nose normal.   Mouth/Throat: Oropharynx is clear and moist. Mucous membranes are moist. No oropharyngeal exudate. Oropharynx is clear.      Comments: No angioedema  Eyes: Conjunctivae and EOM are normal. Pupils are equal, round, and reactive to light. Right eye exhibits no discharge. Left eye exhibits no discharge. No scleral icterus.   Neck: Neck supple.   Cardiovascular: Normal rate, regular rhythm and normal heart sounds.   No murmur heard.Exam reveals no gallop and no friction rub.      Comments: Pectus excavatum deformity noted   Pulmonary/Chest: Effort normal. No respiratory distress. He has no " wheezes. He has no rales.   Abdominal: Bowel sounds are normal. He exhibits no distension. Soft.   Musculoskeletal:      Right lower leg: No edema.      Left lower leg: No edema.   Lymphadenopathy:     He has no cervical adenopathy.   Neurological: He is alert and oriented to person, place, and time.   Skin: Skin is warm, dry, not diaphoretic and rash. Capillary refill takes less than 2 seconds. No erythema         Comments: Diffuse urticarial maculopapular rash on bilateral extremities and chest. Worse on lower extremities.    Psychiatric: His behavior is normal.   Nursing note and vitals reviewed.        Assessment:       1. Urticaria          Plan:         Urticaria  -     Discontinue: methylPREDNISolone (MEDROL DOSEPACK) 4 mg tablet; use as directed on package for 6 days  Dispense: 1 each; Refill: 0  -     cetirizine (ZYRTEC) 10 MG tablet; Take 1 tablet (10 mg total) by mouth 2 (two) times daily. for 10 days  Dispense: 20 tablet; Refill: 0  -     triamcinolone acetonide 0.5% (KENALOG) 0.5 % Crea; Apply topically 2 (two) times daily.  Dispense: 15 g; Refill: 0  -     methylPREDNISolone sod suc(PF) injection 125 mg  -     diphenhydrAMINE capsule 25 mg    Lip swelling  -     methylPREDNISolone sod suc(PF) injection 125 mg  -     diphenhydrAMINE capsule 25 mg    Covid vs heat vs grasses/pollens. No new medications, no new foods. Treatment as above.     Update:    Patient returned to clinic at 500p complaining of new onset upper lip swelling which started while watching Thor in the movie theaters. He has not started any medication prescribed at visit. Not present during visit earlier in the day. On exam his vitals are normal, no wheezing. He has very slight upper lip swelling. No stridor, speaking in full sentences. I will treat with a solumedrol injection and benadryl in clinic and monitor. I will discontinue the medrol dose pack prescribed earlier and patient was instructed not to take this medication. He can start  on 10 mg zyrtec BID tomorrow with follow up with PCP later in week.     Monitored in clinic for 45 minutes. No worsening of symptoms. Lip swelling appears slightly improved to me. Will d/c medrol dose pack prescribed today and start on zyrtec 10 mg BID tomorrow. As a precaution I will also prescribe an Epi-Pen and gave patient instructions on when to use this.  ED precautions also discussed.

## 2022-10-03 ENCOUNTER — OFFICE VISIT (OUTPATIENT)
Dept: PSYCHIATRY | Facility: CLINIC | Age: 22
End: 2022-10-03
Payer: MEDICAID

## 2022-10-03 VITALS
SYSTOLIC BLOOD PRESSURE: 120 MMHG | DIASTOLIC BLOOD PRESSURE: 83 MMHG | HEART RATE: 87 BPM | BODY MASS INDEX: 18.86 KG/M2 | WEIGHT: 135.25 LBS

## 2022-10-03 DIAGNOSIS — F84.0 AUTISM SPECTRUM DISORDER: ICD-10-CM

## 2022-10-03 DIAGNOSIS — F43.23 ADJUSTMENT DISORDER WITH MIXED ANXIETY AND DEPRESSED MOOD: Primary | ICD-10-CM

## 2022-10-03 PROCEDURE — 99999 PR PBB SHADOW E&M-EST. PATIENT-LVL III: CPT | Mod: PBBFAC,,, | Performed by: PSYCHIATRY & NEUROLOGY

## 2022-10-03 PROCEDURE — 99999 PR PBB SHADOW E&M-EST. PATIENT-LVL III: ICD-10-PCS | Mod: PBBFAC,,, | Performed by: PSYCHIATRY & NEUROLOGY

## 2022-10-03 PROCEDURE — 3008F PR BODY MASS INDEX (BMI) DOCUMENTED: ICD-10-PCS | Mod: CPTII,,, | Performed by: PSYCHIATRY & NEUROLOGY

## 2022-10-03 PROCEDURE — 3079F PR MOST RECENT DIASTOLIC BLOOD PRESSURE 80-89 MM HG: ICD-10-PCS | Mod: CPTII,,, | Performed by: PSYCHIATRY & NEUROLOGY

## 2022-10-03 PROCEDURE — 3074F SYST BP LT 130 MM HG: CPT | Mod: CPTII,,, | Performed by: PSYCHIATRY & NEUROLOGY

## 2022-10-03 PROCEDURE — 99214 PR OFFICE/OUTPT VISIT, EST, LEVL IV, 30-39 MIN: ICD-10-PCS | Mod: AF,HB,S$PBB, | Performed by: PSYCHIATRY & NEUROLOGY

## 2022-10-03 PROCEDURE — 3008F BODY MASS INDEX DOCD: CPT | Mod: CPTII,,, | Performed by: PSYCHIATRY & NEUROLOGY

## 2022-10-03 PROCEDURE — 3044F PR MOST RECENT HEMOGLOBIN A1C LEVEL <7.0%: ICD-10-PCS | Mod: CPTII,,, | Performed by: PSYCHIATRY & NEUROLOGY

## 2022-10-03 PROCEDURE — 3079F DIAST BP 80-89 MM HG: CPT | Mod: CPTII,,, | Performed by: PSYCHIATRY & NEUROLOGY

## 2022-10-03 PROCEDURE — 99213 OFFICE O/P EST LOW 20 MIN: CPT | Mod: PBBFAC | Performed by: PSYCHIATRY & NEUROLOGY

## 2022-10-03 PROCEDURE — 3074F PR MOST RECENT SYSTOLIC BLOOD PRESSURE < 130 MM HG: ICD-10-PCS | Mod: CPTII,,, | Performed by: PSYCHIATRY & NEUROLOGY

## 2022-10-03 PROCEDURE — 3044F HG A1C LEVEL LT 7.0%: CPT | Mod: CPTII,,, | Performed by: PSYCHIATRY & NEUROLOGY

## 2022-10-03 PROCEDURE — 99214 OFFICE O/P EST MOD 30 MIN: CPT | Mod: AF,HB,S$PBB, | Performed by: PSYCHIATRY & NEUROLOGY

## 2022-10-03 NOTE — PROGRESS NOTES
Outpatient Psychiatry Follow-Up Visit (MD/NP)  10/3/2022    Clinical Status of Patient:  Outpatient (Ambulatory)    Chief Complaint:  Ramiro Hoffman is a 22 y.o. male who presents today for follow-up of anxiety.    Met with patient.      Interval History and Content of Current Session:  Interim Events/Subjective Report/Content of Current Session:        Doing okay over all, but anxious today about a recent event with his GF of 2.5 years       Ramiro is still maintaining social contact with one friend, with whom he visits for meals and watching horror movies.    Review of Systems   History obtained from the patient.  General ROS: negative for - fatigue and weight loss or weight loss.   Psychological ROS: negative for - depression, hallucinations, hostility, mood swings, physical abuse, sexual abuse or sleep disturbances  Ophthalmic ROS: negative for - decreased vision or dry eyes  ENT ROS: negative for - epistaxis, nasal congestion or oral lesions  Hematological and Lymphatic ROS: negative for - bruising, jaundice or pallor  Endocrine ROS: negative for - galactorrhea, skin changes or temperature intolerance  Respiratory ROS: no cough or shortness of breath  Cardiovascular ROS: no chest pain or tachycardia  Gastrointestinal ROS: no abdominal pain, change in bowel habits  Urinary ROS: no dysuria, trouble voiding or hematuria  Neurological ROS: negative for - headaches, seizures, tremors. Eyeblink tics no longer happening  Dermatological ROS: negative for rash    Past Medical, Family and Social History: The patient's past medical, family and social history have been reviewed and updated as appropriate within the electronic medical record - see encounter notes.  Past Medical History:   Diagnosis Date    ADHD (attention deficit hyperactivity disorder)     Autism spectrum disorder 2004    Lactose intolerance      Current Outpatient Medications on File Prior to Visit   Medication Sig Dispense Refill    amantadine HCL  (SYMMETREL) 100 mg capsule TAKE 1 CAPSULE BY MOUTH EVERY MORNING. 30 capsule 5    cetirizine (ZYRTEC) 10 MG tablet Take 1 tablet (10 mg total) by mouth 2 (two) times daily. for 10 days 20 tablet 0    EPINEPHrine (EPIPEN 2-ELKIN) 0.3 mg/0.3 mL AtIn Inject 0.3 mLs (0.3 mg total) into the muscle once as needed (Anaphylaxis). 0.3 mL 0    risperiDONE (RISPERDAL) 2 MG tablet TAKE 1 TABLET BY MOUTH EVERY EVENING. 30 tablet 5    triamcinolone acetonide 0.5% (KENALOG) 0.5 % Crea Apply topically 2 (two) times daily. 15 g 0     No current facility-administered medications on file prior to visit.     Compliance: yes  Side effects: None. Ramiro denies any problems with headache, stomach upset, weight loss, insomnia, chest pain, palpitations, tics, or tremors.    Risk Parameters:  Patient reports no suicidal ideation  Patient reports no homicidal ideation  Patient reports no self-injurious behavior  Patient reports no violent behavior   Ramiro denies any use of alcohol, cannabis, or other drugs.    Exam (detailed: at least 9 elements; comprehensive: all 15 elements)   Constitutional  Vitals:   weight is 61.4 kg (135 lb 4 oz). His blood pressure is 120/83 and his pulse is 87.      General:  age appropriate, casually dressed   Musculoskeletal  Muscle Strength/Tone:  no tremor, no tic.    Gait & Station:  non-ataxic   Psychiatric  Speech:  spontaneous, monotone, conversational volume, large quantity today   Mood & Affect:  anxious  blunted, mood-congruent   Thought Process:  concrete, with some over-personalized tangents   Associations:  intact   Thought Content:  no suicidality, no homicidality, delusions, or paranoia   Insight:  has awareness of illness, but it is concrete and rudimentary, though he is no longer defensive about it   Judgement: impaired social understanding   Orientation:  person, place, time/date, situation, day of week   Memory: intact for content of interview   Language: awkward prosody, some  semantic/pragmatic errors, low vocabulary for age.   Attention Span & Concentration:  able to focus   Fund of Knowledge:  familiar with aspects of current personal life     Assessment and Diagnosis   Status/Progress: Based on the examination today, the patient's problem(s) is/are  optimally controlled given his ability.   New problems have been presented tat last visit and are not much improved today. Comorbidities are complicating management of the primary condition.  There are no active rule-out diagnoses for this patient at this time.    General Impression:   1. Adjustment disorder with mixed anxiety and depressed mood        2. Autism spectrum disorder, level 2          Intervention/Counseling/Treatment Plan   Medication Management:   Continue risperidone 2 mg qHS   Continue amantadine 100 mg daily  Outside records/collateral information from additional sources: reviewed collateral reports from his mother  Counseling provided with family as follows: techniques he continues to appropriately confront and protect himself from bullying behavior by peers, risks and benefits of treatment options, including medications, were discussed with the patient, risk factor reduction, family education  Risperidone monitoring labs due at next visit    Return to Clinic: 3 weeks

## 2022-11-14 ENCOUNTER — OFFICE VISIT (OUTPATIENT)
Dept: INTERNAL MEDICINE | Facility: CLINIC | Age: 22
End: 2022-11-14
Payer: MEDICAID

## 2022-11-14 ENCOUNTER — LAB VISIT (OUTPATIENT)
Dept: LAB | Facility: HOSPITAL | Age: 22
End: 2022-11-14
Attending: INTERNAL MEDICINE
Payer: MEDICAID

## 2022-11-14 VITALS
WEIGHT: 138.88 LBS | OXYGEN SATURATION: 98 % | HEART RATE: 73 BPM | BODY MASS INDEX: 19.44 KG/M2 | RESPIRATION RATE: 18 BRPM | DIASTOLIC BLOOD PRESSURE: 72 MMHG | HEIGHT: 71 IN | SYSTOLIC BLOOD PRESSURE: 113 MMHG

## 2022-11-14 DIAGNOSIS — F84.0 AUTISM SPECTRUM DISORDER: ICD-10-CM

## 2022-11-14 DIAGNOSIS — R25.3 EYE MUSCLE TWITCHES: ICD-10-CM

## 2022-11-14 DIAGNOSIS — Z00.00 ROUTINE GENERAL MEDICAL EXAMINATION AT A HEALTH CARE FACILITY: ICD-10-CM

## 2022-11-14 DIAGNOSIS — Q67.6 PECTUS EXCAVATUM: Primary | ICD-10-CM

## 2022-11-14 LAB
ALBUMIN SERPL BCP-MCNC: 4.6 G/DL (ref 3.5–5.2)
ALP SERPL-CCNC: 61 U/L (ref 55–135)
ALT SERPL W/O P-5'-P-CCNC: 11 U/L (ref 10–44)
ANION GAP SERPL CALC-SCNC: 12 MMOL/L (ref 8–16)
AST SERPL-CCNC: 11 U/L (ref 10–40)
BASOPHILS # BLD AUTO: 0.02 K/UL (ref 0–0.2)
BASOPHILS NFR BLD: 0.3 % (ref 0–1.9)
BILIRUB SERPL-MCNC: 0.6 MG/DL (ref 0.1–1)
BUN SERPL-MCNC: 16 MG/DL (ref 6–20)
CALCIUM SERPL-MCNC: 9.8 MG/DL (ref 8.7–10.5)
CHLORIDE SERPL-SCNC: 104 MMOL/L (ref 95–110)
CO2 SERPL-SCNC: 25 MMOL/L (ref 23–29)
CREAT SERPL-MCNC: 0.9 MG/DL (ref 0.5–1.4)
DIFFERENTIAL METHOD: NORMAL
EOSINOPHIL # BLD AUTO: 0 K/UL (ref 0–0.5)
EOSINOPHIL NFR BLD: 0.7 % (ref 0–8)
ERYTHROCYTE [DISTWIDTH] IN BLOOD BY AUTOMATED COUNT: 11.5 % (ref 11.5–14.5)
EST. GFR  (NO RACE VARIABLE): >60 ML/MIN/1.73 M^2
GLUCOSE SERPL-MCNC: 72 MG/DL (ref 70–110)
HCT VFR BLD AUTO: 43.2 % (ref 40–54)
HCV AB SERPL QL IA: NORMAL
HGB BLD-MCNC: 14.8 G/DL (ref 14–18)
HIV 1+2 AB+HIV1 P24 AG SERPL QL IA: NORMAL
IMM GRANULOCYTES # BLD AUTO: 0.01 K/UL (ref 0–0.04)
IMM GRANULOCYTES NFR BLD AUTO: 0.2 % (ref 0–0.5)
LYMPHOCYTES # BLD AUTO: 1.9 K/UL (ref 1–4.8)
LYMPHOCYTES NFR BLD: 32.6 % (ref 18–48)
MCH RBC QN AUTO: 30.6 PG (ref 27–31)
MCHC RBC AUTO-ENTMCNC: 34.3 G/DL (ref 32–36)
MCV RBC AUTO: 89 FL (ref 82–98)
MONOCYTES # BLD AUTO: 0.6 K/UL (ref 0.3–1)
MONOCYTES NFR BLD: 10.4 % (ref 4–15)
NEUTROPHILS # BLD AUTO: 3.2 K/UL (ref 1.8–7.7)
NEUTROPHILS NFR BLD: 55.8 % (ref 38–73)
NRBC BLD-RTO: 0 /100 WBC
PLATELET # BLD AUTO: 253 K/UL (ref 150–450)
PMV BLD AUTO: 10 FL (ref 9.2–12.9)
POTASSIUM SERPL-SCNC: 3.9 MMOL/L (ref 3.5–5.1)
PROT SERPL-MCNC: 7.6 G/DL (ref 6–8.4)
RBC # BLD AUTO: 4.84 M/UL (ref 4.6–6.2)
SODIUM SERPL-SCNC: 141 MMOL/L (ref 136–145)
TSH SERPL DL<=0.005 MIU/L-ACNC: 1.24 UIU/ML (ref 0.4–4)
WBC # BLD AUTO: 5.77 K/UL (ref 3.9–12.7)

## 2022-11-14 PROCEDURE — 3008F BODY MASS INDEX DOCD: CPT | Mod: CPTII,,, | Performed by: INTERNAL MEDICINE

## 2022-11-14 PROCEDURE — 3074F PR MOST RECENT SYSTOLIC BLOOD PRESSURE < 130 MM HG: ICD-10-PCS | Mod: CPTII,,, | Performed by: INTERNAL MEDICINE

## 2022-11-14 PROCEDURE — 3078F PR MOST RECENT DIASTOLIC BLOOD PRESSURE < 80 MM HG: ICD-10-PCS | Mod: CPTII,,, | Performed by: INTERNAL MEDICINE

## 2022-11-14 PROCEDURE — 84443 ASSAY THYROID STIM HORMONE: CPT | Performed by: INTERNAL MEDICINE

## 2022-11-14 PROCEDURE — 3074F SYST BP LT 130 MM HG: CPT | Mod: CPTII,,, | Performed by: INTERNAL MEDICINE

## 2022-11-14 PROCEDURE — 99213 OFFICE O/P EST LOW 20 MIN: CPT | Mod: PBBFAC | Performed by: INTERNAL MEDICINE

## 2022-11-14 PROCEDURE — 85025 COMPLETE CBC W/AUTO DIFF WBC: CPT | Performed by: INTERNAL MEDICINE

## 2022-11-14 PROCEDURE — 3078F DIAST BP <80 MM HG: CPT | Mod: CPTII,,, | Performed by: INTERNAL MEDICINE

## 2022-11-14 PROCEDURE — 80053 COMPREHEN METABOLIC PANEL: CPT | Performed by: INTERNAL MEDICINE

## 2022-11-14 PROCEDURE — 1159F MED LIST DOCD IN RCRD: CPT | Mod: CPTII,,, | Performed by: INTERNAL MEDICINE

## 2022-11-14 PROCEDURE — 99214 PR OFFICE/OUTPT VISIT, EST, LEVL IV, 30-39 MIN: ICD-10-PCS | Mod: S$PBB,,, | Performed by: INTERNAL MEDICINE

## 2022-11-14 PROCEDURE — 1160F PR REVIEW ALL MEDS BY PRESCRIBER/CLIN PHARMACIST DOCUMENTED: ICD-10-PCS | Mod: CPTII,,, | Performed by: INTERNAL MEDICINE

## 2022-11-14 PROCEDURE — 99999 PR PBB SHADOW E&M-EST. PATIENT-LVL III: ICD-10-PCS | Mod: PBBFAC,,, | Performed by: INTERNAL MEDICINE

## 2022-11-14 PROCEDURE — 86803 HEPATITIS C AB TEST: CPT | Performed by: INTERNAL MEDICINE

## 2022-11-14 PROCEDURE — 1159F PR MEDICATION LIST DOCUMENTED IN MEDICAL RECORD: ICD-10-PCS | Mod: CPTII,,, | Performed by: INTERNAL MEDICINE

## 2022-11-14 PROCEDURE — 3008F PR BODY MASS INDEX (BMI) DOCUMENTED: ICD-10-PCS | Mod: CPTII,,, | Performed by: INTERNAL MEDICINE

## 2022-11-14 PROCEDURE — 99999 PR PBB SHADOW E&M-EST. PATIENT-LVL III: CPT | Mod: PBBFAC,,, | Performed by: INTERNAL MEDICINE

## 2022-11-14 PROCEDURE — 36415 COLL VENOUS BLD VENIPUNCTURE: CPT | Performed by: INTERNAL MEDICINE

## 2022-11-14 PROCEDURE — 1160F RVW MEDS BY RX/DR IN RCRD: CPT | Mod: CPTII,,, | Performed by: INTERNAL MEDICINE

## 2022-11-14 PROCEDURE — 87389 HIV-1 AG W/HIV-1&-2 AB AG IA: CPT | Performed by: INTERNAL MEDICINE

## 2022-11-14 PROCEDURE — 99214 OFFICE O/P EST MOD 30 MIN: CPT | Mod: S$PBB,,, | Performed by: INTERNAL MEDICINE

## 2022-11-14 NOTE — PROGRESS NOTES
Subjective:       Patient ID: Ramiro Hoffman is a 22 y.o. male.    Chief Complaint: Follow-up    Patient is here for followup for chronic conditions.    Has a twitch both eyes. symptoms for weeks, usually in public. Not as much of an issue at home. Has had previously in HS too. Thinks it is from anxiety/stress which has been ongoing especially with status of his relationship.  denies neurologic deficit -- no weakness, no change in vision, no vertigo or slurred speech or probs swallowing, no imbalance or problems walking  No tremor of the hands/arms.    Also wants pectus excavatum examined. No symptoms from this.      Review of Systems   Constitutional:  Negative for appetite change and unexpected weight change.   Respiratory:  Negative for chest tightness and shortness of breath.    Cardiovascular:  Negative for chest pain.   Gastrointestinal:  Negative for abdominal pain.   Genitourinary:  Negative for difficulty urinating, scrotal swelling and testicular pain.   Skin:         No lesions   Neurological:  Negative for dizziness, seizures, syncope, facial asymmetry, speech difficulty, weakness, light-headedness, numbness and headaches.        Eye twitch   Psychiatric/Behavioral:  The patient is nervous/anxious.          Objective:      Physical Exam  Vitals reviewed.   Constitutional:       General: He is not in acute distress.     Appearance: Normal appearance. He is well-developed. He is not ill-appearing, toxic-appearing or diaphoretic.   HENT:      Head: Normocephalic and atraumatic.   Eyes:      General: No scleral icterus.  Neck:      Thyroid: No thyromegaly.   Cardiovascular:      Rate and Rhythm: Normal rate and regular rhythm.      Heart sounds: Normal heart sounds. No murmur heard.    No friction rub. No gallop.      Comments: Mild pectus excavatum observed. Not tender  Pulmonary:      Effort: Pulmonary effort is normal. No respiratory distress.      Breath sounds: Normal breath sounds. No wheezing or  rales.   Abdominal:      General: Bowel sounds are normal. There is no distension.      Palpations: Abdomen is soft. There is no mass.      Tenderness: There is no abdominal tenderness. There is no guarding or rebound.   Musculoskeletal:         General: Normal range of motion.      Cervical back: Normal range of motion.   Lymphadenopathy:      Cervical: No cervical adenopathy.   Skin:     Findings: No lesion.   Neurological:      Mental Status: He is alert and oriented to person, place, and time.      Cranial Nerves: No cranial nerve deficit.      Sensory: No sensory deficit.      Motor: No weakness.      Coordination: Coordination normal.      Gait: Gait normal.      Deep Tendon Reflexes: Reflexes normal.      Comments: There is a frequ bilat eye twitch which appears to extinguish when asked to do EOMs  No hand tremor or arm rigidity bilat   Psychiatric:         Mood and Affect: Mood normal.         Behavior: Behavior normal.         Thought Content: Thought content normal.       Assessment:       1. Pectus excavatum    2. Eye muscle twitches    3. Autism spectrum disorder, level 2    4. Routine general medical examination at a health care facility        Plan:       Ramiro was seen today for follow-up.    Diagnoses and all orders for this visit:    Pectus excavatum  Asymptomatic, no issues found.    Eye muscle twitches  -     CBC Auto Differential; Future  -     Comprehensive Metabolic Panel; Future  -     TSH; Future  Likely stress, he drinks just 1 caffeinated yadira per day. He claims to get good sleep each night.  I will communicate with Dr Wan whether could be med related. Check labs.  Call if symptoms worsen  Offered psychotherapy with Johnnie Goins, father will consider, he does not feel a need at this time    Autism spectrum disorder, level 2  -     CBC Auto Differential; Future  -     Comprehensive Metabolic Panel; Future    Routine general medical examination at a health care facility  -     Hepatitis  C Antibody; Future  -     HIV 1/2 Ag/Ab (4th Gen); Future        Health Maintenance         Date Due Completion Date    Hepatitis C Screening Never done ---    HIV Screening Never done ---    TETANUS VACCINE 06/03/2032 6/3/2022    DTaP/Tdap/Td Vaccines (5 - Td or Tdap) 06/03/2032 6/3/2022            No follow-ups on file.    Future Appointments   Date Time Provider Department Center   11/14/2022 10:00 AM LAB, SAME DAY [facility] LAB IM [facility]   6/12/2023 10:00 AM Scott Hinton MD [facility]

## 2023-02-15 DIAGNOSIS — F90.8 HYPERKINESIS OF CHILDHOOD WITH DEVELOPMENTAL DELAY: ICD-10-CM

## 2023-02-15 DIAGNOSIS — F84.0 AUTISM SPECTRUM DISORDER: ICD-10-CM

## 2023-02-15 RX ORDER — AMANTADINE HYDROCHLORIDE 100 MG/1
100 CAPSULE, GELATIN COATED ORAL EVERY MORNING
Qty: 30 CAPSULE | Refills: 5 | Status: SHIPPED | OUTPATIENT
Start: 2023-02-15 | End: 2023-04-21 | Stop reason: SDUPTHER

## 2023-02-15 RX ORDER — RISPERIDONE 2 MG/1
2 TABLET ORAL NIGHTLY
Qty: 30 TABLET | Refills: 5 | Status: SHIPPED | OUTPATIENT
Start: 2023-02-15 | End: 2023-04-21 | Stop reason: SDUPTHER

## 2023-04-21 ENCOUNTER — OFFICE VISIT (OUTPATIENT)
Dept: PSYCHIATRY | Facility: CLINIC | Age: 23
End: 2023-04-21
Payer: MEDICAID

## 2023-04-21 VITALS
HEIGHT: 70 IN | HEART RATE: 76 BPM | WEIGHT: 142.75 LBS | BODY MASS INDEX: 20.44 KG/M2 | DIASTOLIC BLOOD PRESSURE: 83 MMHG | SYSTOLIC BLOOD PRESSURE: 129 MMHG

## 2023-04-21 DIAGNOSIS — Z79.899 ENCOUNTER FOR LONG-TERM (CURRENT) USE OF OTHER MEDICATIONS: ICD-10-CM

## 2023-04-21 DIAGNOSIS — F43.23 ADJUSTMENT DISORDER WITH MIXED ANXIETY AND DEPRESSED MOOD: ICD-10-CM

## 2023-04-21 DIAGNOSIS — F84.0 AUTISM SPECTRUM DISORDER: Primary | ICD-10-CM

## 2023-04-21 DIAGNOSIS — F90.8 HYPERKINESIS OF CHILDHOOD WITH DEVELOPMENTAL DELAY: ICD-10-CM

## 2023-04-21 PROCEDURE — 1159F PR MEDICATION LIST DOCUMENTED IN MEDICAL RECORD: ICD-10-PCS | Mod: CPTII,,, | Performed by: PSYCHIATRY & NEUROLOGY

## 2023-04-21 PROCEDURE — 3044F PR MOST RECENT HEMOGLOBIN A1C LEVEL <7.0%: ICD-10-PCS | Mod: CPTII,,, | Performed by: PSYCHIATRY & NEUROLOGY

## 2023-04-21 PROCEDURE — 99213 OFFICE O/P EST LOW 20 MIN: CPT | Mod: PBBFAC | Performed by: PSYCHIATRY & NEUROLOGY

## 2023-04-21 PROCEDURE — 99999 PR PBB SHADOW E&M-EST. PATIENT-LVL III: CPT | Mod: PBBFAC,,, | Performed by: PSYCHIATRY & NEUROLOGY

## 2023-04-21 PROCEDURE — 99214 PR OFFICE/OUTPT VISIT, EST, LEVL IV, 30-39 MIN: ICD-10-PCS | Mod: AF,HB,S$PBB, | Performed by: PSYCHIATRY & NEUROLOGY

## 2023-04-21 PROCEDURE — 3074F PR MOST RECENT SYSTOLIC BLOOD PRESSURE < 130 MM HG: ICD-10-PCS | Mod: CPTII,,, | Performed by: PSYCHIATRY & NEUROLOGY

## 2023-04-21 PROCEDURE — 1159F MED LIST DOCD IN RCRD: CPT | Mod: CPTII,,, | Performed by: PSYCHIATRY & NEUROLOGY

## 2023-04-21 PROCEDURE — 99999 PR PBB SHADOW E&M-EST. PATIENT-LVL III: ICD-10-PCS | Mod: PBBFAC,,, | Performed by: PSYCHIATRY & NEUROLOGY

## 2023-04-21 PROCEDURE — 3079F PR MOST RECENT DIASTOLIC BLOOD PRESSURE 80-89 MM HG: ICD-10-PCS | Mod: CPTII,,, | Performed by: PSYCHIATRY & NEUROLOGY

## 2023-04-21 PROCEDURE — 3079F DIAST BP 80-89 MM HG: CPT | Mod: CPTII,,, | Performed by: PSYCHIATRY & NEUROLOGY

## 2023-04-21 PROCEDURE — 3044F HG A1C LEVEL LT 7.0%: CPT | Mod: CPTII,,, | Performed by: PSYCHIATRY & NEUROLOGY

## 2023-04-21 PROCEDURE — 1160F RVW MEDS BY RX/DR IN RCRD: CPT | Mod: CPTII,,, | Performed by: PSYCHIATRY & NEUROLOGY

## 2023-04-21 PROCEDURE — 3074F SYST BP LT 130 MM HG: CPT | Mod: CPTII,,, | Performed by: PSYCHIATRY & NEUROLOGY

## 2023-04-21 PROCEDURE — 3008F PR BODY MASS INDEX (BMI) DOCUMENTED: ICD-10-PCS | Mod: CPTII,,, | Performed by: PSYCHIATRY & NEUROLOGY

## 2023-04-21 PROCEDURE — 3008F BODY MASS INDEX DOCD: CPT | Mod: CPTII,,, | Performed by: PSYCHIATRY & NEUROLOGY

## 2023-04-21 PROCEDURE — 99214 OFFICE O/P EST MOD 30 MIN: CPT | Mod: AF,HB,S$PBB, | Performed by: PSYCHIATRY & NEUROLOGY

## 2023-04-21 PROCEDURE — 1160F PR REVIEW ALL MEDS BY PRESCRIBER/CLIN PHARMACIST DOCUMENTED: ICD-10-PCS | Mod: CPTII,,, | Performed by: PSYCHIATRY & NEUROLOGY

## 2023-04-21 RX ORDER — RISPERIDONE 2 MG/1
2 TABLET ORAL NIGHTLY
Qty: 30 TABLET | Refills: 11 | Status: SHIPPED | OUTPATIENT
Start: 2023-04-21

## 2023-04-21 RX ORDER — AMANTADINE HYDROCHLORIDE 100 MG/1
100 CAPSULE, GELATIN COATED ORAL EVERY MORNING
Qty: 30 CAPSULE | Refills: 11 | Status: SHIPPED | OUTPATIENT
Start: 2023-04-21

## 2023-04-21 NOTE — PROGRESS NOTES
Outpatient Psychiatry Follow-Up Visit (MD/NP)  4/21/2023    Clinical Status of Patient:  Outpatient (Ambulatory)    Chief Complaint:  Ramiro Hoffman is a 22 y.o. male who presents today for follow-up of anxiety.    Met with patient.      Interval History and Content of Current Session:  Interim Events/Subjective Report/Content of Current Session:        Doing okay over all, sad and a little angry about GF breaking up with him in February. Sad intermittently still but not depressed        Discussed and gave C and parents contacts nd info for DD related activities in [facility]       Ramiro is still maintaining social contact with one friend, with whom he visits for meals and watching horror movies.    Review of Systems   History obtained from the patient.  General ROS: negative for - fatigue and weight loss or weight gain  Psychological ROS: negative for - depression, hallucinations, hostility, mood swings, physical abuse, sexual abuse or sleep disturbances  Ophthalmic ROS: negative for - decreased vision or dry eyes  ENT ROS: negative for - epistaxis, nasal congestion or oral lesions  Hematological and Lymphatic ROS: negative for - bruising, jaundice or pallor  Endocrine ROS: negative for - galactorrhea, skin changes or temperature intolerance  Respiratory ROS: no cough or shortness of breath  Cardiovascular ROS: no chest pain or tachycardia  Gastrointestinal ROS: no abdominal pain, change in bowel habits  Urinary ROS: no dysuria, trouble voiding or hematuria  Neurological ROS: negative for - headaches, seizures, tremors. Eyeblink tics no longer happening  Dermatological ROS: negative for rash    Past Medical, Family and Social History: The patient's past medical, family and social history have been reviewed and updated as appropriate within the electronic medical record - see encounter notes.  Past Medical History:   Diagnosis Date    ADHD (attention deficit hyperactivity disorder)     Autism spectrum  disorder 2004    Lactose intolerance      Current Outpatient Medications on File Prior to Visit   Medication Sig Dispense Refill    amantadine HCL (SYMMETREL) 100 mg capsule Take 1 capsule (100 mg total) by mouth every morning. 30 capsule 5    cetirizine (ZYRTEC) 10 MG tablet Take 1 tablet (10 mg total) by mouth 2 (two) times daily. for 10 days 20 tablet 0    EPINEPHrine (EPIPEN 2-ELKIN) 0.3 mg/0.3 mL AtIn Inject 0.3 mLs (0.3 mg total) into the muscle once as needed (Anaphylaxis). 0.3 mL 0    risperiDONE (RISPERDAL) 2 MG tablet Take 1 tablet (2 mg total) by mouth every evening. 30 tablet 5    triamcinolone acetonide 0.5% (KENALOG) 0.5 % Crea Apply topically 2 (two) times daily. 15 g 0     No current facility-administered medications on file prior to visit.     Compliance: yes  Side effects: None. Ramiro denies any problems with headache, stomach upset, weight loss, insomnia, chest pain, palpitations, tics, or tremors.    Risk Parameters:  Patient reports no suicidal ideation  Patient reports no homicidal ideation  Patient reports no self-injurious behavior  Patient reports no violent behavior   Ramiro denies any use of alcohol, cannabis, or other drugs.    Exam (detailed: at least 9 elements; comprehensive: all 15 elements)   Constitutional  Vitals:   vitals were not taken for this visit.      General:  age appropriate, casually dressed   Musculoskeletal  Muscle Strength/Tone:  no tremor, no tic.    Gait & Station:  non-ataxic   Psychiatric  Speech:  spontaneous, monotone, conversational volume, large quantity today   Mood & Affect:  anxious  blunted, mood-congruent   Thought Process:  concrete, with some over-personalized tangents   Associations:  intact   Thought Content:  no suicidality, no homicidality, delusions, or paranoia   Insight:  has awareness of illness, but it is concrete and rudimentary, though he is no longer defensive about it   Judgement: impaired social understanding   Orientation:  person,  place, time/date, situation, day of week   Memory: intact for content of interview   Language: awkward prosody, some semantic/pragmatic errors, low vocabulary for age.   Attention Span & Concentration:  able to focus   Fund of Knowledge:  familiar with aspects of current personal life     Assessment and Diagnosis   Status/Progress: Based on the examination today, the patient's problem(s) is/are  optimally controlled given his ability.   New problems have not been presented tat last visit and are not much improved today. Comorbidities are complicating management of the primary condition.  There are no active rule-out diagnoses for this patient at this time.    General Impression:   1. Autism spectrum disorder, level 2        2. Hyperkinesis of childhood with developmental delay        3. Adjustment disorder with mixed anxiety and depressed mood          Intervention/Counseling/Treatment Plan   Medication Management:   Continue risperidone 2 mg qHS   Continue amantadine 100 mg daily  Outside records/collateral information from additional sources: reviewed collateral reports from his parents  Counseling provided with family as follows: risks and benefits of treatment options, including medications, were discussed with the patient, risk factor reduction, family education  Risperidone monitoring labs due now- he did not follow through on the labs I entered last Spring    Return to Clinic: 6 months

## 2023-04-28 ENCOUNTER — LAB VISIT (OUTPATIENT)
Dept: LAB | Facility: HOSPITAL | Age: 23
End: 2023-04-28
Attending: PSYCHIATRY & NEUROLOGY
Payer: MEDICAID

## 2023-04-28 DIAGNOSIS — Z79.899 ENCOUNTER FOR LONG-TERM (CURRENT) USE OF OTHER MEDICATIONS: ICD-10-CM

## 2023-04-28 LAB
ALBUMIN SERPL BCP-MCNC: 4.3 G/DL (ref 3.5–5.2)
ALP SERPL-CCNC: 78 U/L (ref 55–135)
ALT SERPL W/O P-5'-P-CCNC: 30 U/L (ref 10–44)
ANION GAP SERPL CALC-SCNC: 7 MMOL/L (ref 8–16)
AST SERPL-CCNC: 17 U/L (ref 10–40)
BASOPHILS # BLD AUTO: 0.04 K/UL (ref 0–0.2)
BASOPHILS NFR BLD: 0.8 % (ref 0–1.9)
BILIRUB SERPL-MCNC: 0.9 MG/DL (ref 0.1–1)
BUN SERPL-MCNC: 15 MG/DL (ref 6–20)
CALCIUM SERPL-MCNC: 9.6 MG/DL (ref 8.7–10.5)
CHLORIDE SERPL-SCNC: 106 MMOL/L (ref 95–110)
CHOLEST SERPL-MCNC: 133 MG/DL (ref 120–199)
CHOLEST/HDLC SERPL: 2.5 {RATIO} (ref 2–5)
CO2 SERPL-SCNC: 26 MMOL/L (ref 23–29)
CREAT SERPL-MCNC: 0.9 MG/DL (ref 0.5–1.4)
DIFFERENTIAL METHOD: ABNORMAL
EOSINOPHIL # BLD AUTO: 0.1 K/UL (ref 0–0.5)
EOSINOPHIL NFR BLD: 1 % (ref 0–8)
ERYTHROCYTE [DISTWIDTH] IN BLOOD BY AUTOMATED COUNT: 11.3 % (ref 11.5–14.5)
EST. GFR  (NO RACE VARIABLE): >60 ML/MIN/1.73 M^2
ESTIMATED AVG GLUCOSE: 97 MG/DL (ref 68–131)
GLUCOSE SERPL-MCNC: 95 MG/DL (ref 70–110)
HBA1C MFR BLD: 5 % (ref 4–5.6)
HCT VFR BLD AUTO: 41.4 % (ref 40–54)
HDLC SERPL-MCNC: 53 MG/DL (ref 40–75)
HDLC SERPL: 39.8 % (ref 20–50)
HGB BLD-MCNC: 13.8 G/DL (ref 14–18)
IMM GRANULOCYTES # BLD AUTO: 0.01 K/UL (ref 0–0.04)
IMM GRANULOCYTES NFR BLD AUTO: 0.2 % (ref 0–0.5)
LDLC SERPL CALC-MCNC: 65 MG/DL (ref 63–159)
LYMPHOCYTES # BLD AUTO: 1.8 K/UL (ref 1–4.8)
LYMPHOCYTES NFR BLD: 37.3 % (ref 18–48)
MCH RBC QN AUTO: 29.6 PG (ref 27–31)
MCHC RBC AUTO-ENTMCNC: 33.3 G/DL (ref 32–36)
MCV RBC AUTO: 89 FL (ref 82–98)
MONOCYTES # BLD AUTO: 0.4 K/UL (ref 0.3–1)
MONOCYTES NFR BLD: 7.5 % (ref 4–15)
NEUTROPHILS # BLD AUTO: 2.6 K/UL (ref 1.8–7.7)
NEUTROPHILS NFR BLD: 53.2 % (ref 38–73)
NONHDLC SERPL-MCNC: 80 MG/DL
NRBC BLD-RTO: 0 /100 WBC
PLATELET # BLD AUTO: 223 K/UL (ref 150–450)
PMV BLD AUTO: 10.4 FL (ref 9.2–12.9)
POTASSIUM SERPL-SCNC: 4.1 MMOL/L (ref 3.5–5.1)
PROT SERPL-MCNC: 7 G/DL (ref 6–8.4)
RBC # BLD AUTO: 4.67 M/UL (ref 4.6–6.2)
SODIUM SERPL-SCNC: 139 MMOL/L (ref 136–145)
TRIGL SERPL-MCNC: 75 MG/DL (ref 30–150)
WBC # BLD AUTO: 4.8 K/UL (ref 3.9–12.7)

## 2023-04-28 PROCEDURE — 80061 LIPID PANEL: CPT | Performed by: PSYCHIATRY & NEUROLOGY

## 2023-04-28 PROCEDURE — 80053 COMPREHEN METABOLIC PANEL: CPT | Performed by: PSYCHIATRY & NEUROLOGY

## 2023-04-28 PROCEDURE — 83036 HEMOGLOBIN GLYCOSYLATED A1C: CPT | Performed by: PSYCHIATRY & NEUROLOGY

## 2023-04-28 PROCEDURE — 36415 COLL VENOUS BLD VENIPUNCTURE: CPT | Mod: PO | Performed by: PSYCHIATRY & NEUROLOGY

## 2023-04-28 PROCEDURE — 85025 COMPLETE CBC W/AUTO DIFF WBC: CPT | Performed by: PSYCHIATRY & NEUROLOGY

## 2023-06-12 ENCOUNTER — OFFICE VISIT (OUTPATIENT)
Dept: INTERNAL MEDICINE | Facility: CLINIC | Age: 23
End: 2023-06-12
Payer: MEDICAID

## 2023-06-12 VITALS
SYSTOLIC BLOOD PRESSURE: 125 MMHG | OXYGEN SATURATION: 98 % | HEART RATE: 78 BPM | RESPIRATION RATE: 18 BRPM | DIASTOLIC BLOOD PRESSURE: 80 MMHG | BODY MASS INDEX: 20.83 KG/M2 | HEIGHT: 70 IN | WEIGHT: 145.5 LBS

## 2023-06-12 DIAGNOSIS — H53.8 BLURRED VISION, BILATERAL: ICD-10-CM

## 2023-06-12 DIAGNOSIS — Z00.00 ROUTINE GENERAL MEDICAL EXAMINATION AT A HEALTH CARE FACILITY: Primary | ICD-10-CM

## 2023-06-12 DIAGNOSIS — F43.20 ADJUSTMENT DISORDER, UNSPECIFIED TYPE: ICD-10-CM

## 2023-06-12 PROCEDURE — 99999 PR PBB SHADOW E&M-EST. PATIENT-LVL IV: ICD-10-PCS | Mod: PBBFAC,,, | Performed by: INTERNAL MEDICINE

## 2023-06-12 PROCEDURE — 1160F PR REVIEW ALL MEDS BY PRESCRIBER/CLIN PHARMACIST DOCUMENTED: ICD-10-PCS | Mod: CPTII,,, | Performed by: INTERNAL MEDICINE

## 2023-06-12 PROCEDURE — 99395 PREV VISIT EST AGE 18-39: CPT | Mod: S$PBB,,, | Performed by: INTERNAL MEDICINE

## 2023-06-12 PROCEDURE — 3074F PR MOST RECENT SYSTOLIC BLOOD PRESSURE < 130 MM HG: ICD-10-PCS | Mod: CPTII,,, | Performed by: INTERNAL MEDICINE

## 2023-06-12 PROCEDURE — 1160F RVW MEDS BY RX/DR IN RCRD: CPT | Mod: CPTII,,, | Performed by: INTERNAL MEDICINE

## 2023-06-12 PROCEDURE — 3008F BODY MASS INDEX DOCD: CPT | Mod: CPTII,,, | Performed by: INTERNAL MEDICINE

## 2023-06-12 PROCEDURE — 99214 OFFICE O/P EST MOD 30 MIN: CPT | Mod: PBBFAC | Performed by: INTERNAL MEDICINE

## 2023-06-12 PROCEDURE — 3044F PR MOST RECENT HEMOGLOBIN A1C LEVEL <7.0%: ICD-10-PCS | Mod: CPTII,,, | Performed by: INTERNAL MEDICINE

## 2023-06-12 PROCEDURE — 99999 PR PBB SHADOW E&M-EST. PATIENT-LVL IV: CPT | Mod: PBBFAC,,, | Performed by: INTERNAL MEDICINE

## 2023-06-12 PROCEDURE — 3074F SYST BP LT 130 MM HG: CPT | Mod: CPTII,,, | Performed by: INTERNAL MEDICINE

## 2023-06-12 PROCEDURE — 1159F PR MEDICATION LIST DOCUMENTED IN MEDICAL RECORD: ICD-10-PCS | Mod: CPTII,,, | Performed by: INTERNAL MEDICINE

## 2023-06-12 PROCEDURE — 3044F HG A1C LEVEL LT 7.0%: CPT | Mod: CPTII,,, | Performed by: INTERNAL MEDICINE

## 2023-06-12 PROCEDURE — 99395 PR PREVENTIVE VISIT,EST,18-39: ICD-10-PCS | Mod: S$PBB,,, | Performed by: INTERNAL MEDICINE

## 2023-06-12 PROCEDURE — 3079F DIAST BP 80-89 MM HG: CPT | Mod: CPTII,,, | Performed by: INTERNAL MEDICINE

## 2023-06-12 PROCEDURE — 1159F MED LIST DOCD IN RCRD: CPT | Mod: CPTII,,, | Performed by: INTERNAL MEDICINE

## 2023-06-12 PROCEDURE — 3008F PR BODY MASS INDEX (BMI) DOCUMENTED: ICD-10-PCS | Mod: CPTII,,, | Performed by: INTERNAL MEDICINE

## 2023-06-12 PROCEDURE — 3079F PR MOST RECENT DIASTOLIC BLOOD PRESSURE 80-89 MM HG: ICD-10-PCS | Mod: CPTII,,, | Performed by: INTERNAL MEDICINE

## 2023-06-12 NOTE — PROGRESS NOTES
Subjective:       Patient ID: Ramiro Hoffman is a 22 y.o. male.    Chief Complaint: Annual Exam and Eye Problem (Blurry and lines)    Here for annual exam    Some blurriness with far away vision, which comes and goes.    Review of Systems   Constitutional:  Negative for appetite change and unexpected weight change.   Respiratory:  Negative for chest tightness and shortness of breath.    Cardiovascular:  Negative for chest pain.   Gastrointestinal:  Negative for abdominal pain.   Genitourinary:  Negative for difficulty urinating, scrotal swelling and testicular pain.   Skin:         No lesions   Neurological:  Negative for dizziness, seizures, syncope, facial asymmetry, speech difficulty, weakness, light-headedness, numbness and headaches.        Eye twitch   Psychiatric/Behavioral:  The patient is nervous/anxious.          Objective:      Physical Exam  Vitals reviewed.   Constitutional:       General: He is not in acute distress.     Appearance: Normal appearance. He is well-developed. He is not ill-appearing, toxic-appearing or diaphoretic.   HENT:      Head: Normocephalic and atraumatic.   Eyes:      General: No scleral icterus.  Neck:      Thyroid: No thyromegaly.   Cardiovascular:      Rate and Rhythm: Normal rate and regular rhythm.      Heart sounds: Normal heart sounds. No murmur heard.    No friction rub. No gallop.      Comments: Mild pectus excavatum observed. Not tender  Pulmonary:      Effort: Pulmonary effort is normal. No respiratory distress.      Breath sounds: Normal breath sounds. No wheezing or rales.   Abdominal:      General: Bowel sounds are normal. There is no distension.      Palpations: Abdomen is soft. There is no mass.      Tenderness: There is no abdominal tenderness. There is no guarding or rebound.   Musculoskeletal:         General: Normal range of motion.      Cervical back: Normal range of motion.   Lymphadenopathy:      Cervical: No cervical adenopathy.   Skin:     Findings: No  lesion.   Neurological:      Mental Status: He is alert and oriented to person, place, and time.      Cranial Nerves: No cranial nerve deficit.      Sensory: No sensory deficit.      Motor: No weakness.      Coordination: Coordination normal.      Gait: Gait normal.      Deep Tendon Reflexes: Reflexes normal.   Psychiatric:         Mood and Affect: Mood normal.         Behavior: Behavior normal.         Thought Content: Thought content normal.      Comments: Calm, interactive       Assessment:       1. Routine general medical examination at a health care facility    2. Blurred vision, bilateral    3. Adjustment disorder, unspecified type        Plan:       Ramiro was seen today for annual exam and eye problem.    Diagnoses and all orders for this visit:    Routine general medical examination at a health care facility  I reviewed recent blood work results with the patient.      Blurred vision, bilateral  -     Ambulatory referral/consult to Optometry; Future    Adjustment disorder, unspecified type  -     Ambulatory referral/consult to Primary Care Behavioral Health (Non-Opioids); Future  See back psychiatrist also    Lengthy discussion re importance of trying to get some sort of PT employment        Health Maintenance         Date Due Completion Date    TETANUS VACCINE 06/03/2032 6/3/2022    DTaP/Tdap/Td Vaccines (8 - Td or Tdap) 06/03/2032 6/3/2022            Follow up in about 1 year (around 6/12/2024).    Future Appointments   Date Time Provider Department Center   7/3/2023  3:30 PM Markus Wan MD [facility]   6/17/2024 10:00 AM Scott Hinton MD [facility] PCW

## 2023-07-03 ENCOUNTER — OFFICE VISIT (OUTPATIENT)
Dept: PSYCHIATRY | Facility: CLINIC | Age: 23
End: 2023-07-03
Payer: MEDICAID

## 2023-07-03 VITALS
HEART RATE: 79 BPM | DIASTOLIC BLOOD PRESSURE: 87 MMHG | BODY MASS INDEX: 20.89 KG/M2 | WEIGHT: 145.06 LBS | SYSTOLIC BLOOD PRESSURE: 134 MMHG

## 2023-07-03 DIAGNOSIS — F43.23 ADJUSTMENT DISORDER WITH MIXED ANXIETY AND DEPRESSED MOOD: ICD-10-CM

## 2023-07-03 DIAGNOSIS — F42.8 OBSESSIONAL THOUGHTS: ICD-10-CM

## 2023-07-03 DIAGNOSIS — F84.0 AUTISM SPECTRUM DISORDER: Primary | ICD-10-CM

## 2023-07-03 DIAGNOSIS — F90.8 HYPERKINESIS OF CHILDHOOD WITH DEVELOPMENTAL DELAY: ICD-10-CM

## 2023-07-03 PROCEDURE — 3044F HG A1C LEVEL LT 7.0%: CPT | Mod: CPTII,,, | Performed by: PSYCHIATRY & NEUROLOGY

## 2023-07-03 PROCEDURE — 3079F PR MOST RECENT DIASTOLIC BLOOD PRESSURE 80-89 MM HG: ICD-10-PCS | Mod: CPTII,,, | Performed by: PSYCHIATRY & NEUROLOGY

## 2023-07-03 PROCEDURE — 3008F BODY MASS INDEX DOCD: CPT | Mod: CPTII,,, | Performed by: PSYCHIATRY & NEUROLOGY

## 2023-07-03 PROCEDURE — 3044F PR MOST RECENT HEMOGLOBIN A1C LEVEL <7.0%: ICD-10-PCS | Mod: CPTII,,, | Performed by: PSYCHIATRY & NEUROLOGY

## 2023-07-03 PROCEDURE — 1160F PR REVIEW ALL MEDS BY PRESCRIBER/CLIN PHARMACIST DOCUMENTED: ICD-10-PCS | Mod: CPTII,,, | Performed by: PSYCHIATRY & NEUROLOGY

## 2023-07-03 PROCEDURE — 99214 OFFICE O/P EST MOD 30 MIN: CPT | Mod: AF,HB,S$PBB, | Performed by: PSYCHIATRY & NEUROLOGY

## 2023-07-03 PROCEDURE — 99214 PR OFFICE/OUTPT VISIT, EST, LEVL IV, 30-39 MIN: ICD-10-PCS | Mod: AF,HB,S$PBB, | Performed by: PSYCHIATRY & NEUROLOGY

## 2023-07-03 PROCEDURE — 99999 PR PBB SHADOW E&M-EST. PATIENT-LVL III: CPT | Mod: PBBFAC,,, | Performed by: PSYCHIATRY & NEUROLOGY

## 2023-07-03 PROCEDURE — 3075F SYST BP GE 130 - 139MM HG: CPT | Mod: CPTII,,, | Performed by: PSYCHIATRY & NEUROLOGY

## 2023-07-03 PROCEDURE — 3008F PR BODY MASS INDEX (BMI) DOCUMENTED: ICD-10-PCS | Mod: CPTII,,, | Performed by: PSYCHIATRY & NEUROLOGY

## 2023-07-03 PROCEDURE — 1159F MED LIST DOCD IN RCRD: CPT | Mod: CPTII,,, | Performed by: PSYCHIATRY & NEUROLOGY

## 2023-07-03 PROCEDURE — 99213 OFFICE O/P EST LOW 20 MIN: CPT | Mod: PBBFAC | Performed by: PSYCHIATRY & NEUROLOGY

## 2023-07-03 PROCEDURE — 3075F PR MOST RECENT SYSTOLIC BLOOD PRESS GE 130-139MM HG: ICD-10-PCS | Mod: CPTII,,, | Performed by: PSYCHIATRY & NEUROLOGY

## 2023-07-03 PROCEDURE — 1159F PR MEDICATION LIST DOCUMENTED IN MEDICAL RECORD: ICD-10-PCS | Mod: CPTII,,, | Performed by: PSYCHIATRY & NEUROLOGY

## 2023-07-03 PROCEDURE — 3079F DIAST BP 80-89 MM HG: CPT | Mod: CPTII,,, | Performed by: PSYCHIATRY & NEUROLOGY

## 2023-07-03 PROCEDURE — 99999 PR PBB SHADOW E&M-EST. PATIENT-LVL III: ICD-10-PCS | Mod: PBBFAC,,, | Performed by: PSYCHIATRY & NEUROLOGY

## 2023-07-03 PROCEDURE — 1160F RVW MEDS BY RX/DR IN RCRD: CPT | Mod: CPTII,,, | Performed by: PSYCHIATRY & NEUROLOGY

## 2023-07-03 RX ORDER — FLUVOXAMINE MALEATE 25 MG/1
25 TABLET ORAL NIGHTLY
Qty: 30 TABLET | Refills: 3 | Status: SHIPPED | OUTPATIENT
Start: 2023-07-03 | End: 2023-09-14 | Stop reason: SDUPTHER

## 2023-07-03 NOTE — PATIENT INSTRUCTIONS
Geno Patel MD  [address] [facility] [address]  Phone: [phone]  Fax: [phone]

## 2023-07-03 NOTE — PROGRESS NOTES
Outpatient Psychiatry Follow-Up Visit (MD/NP)  7/3/2023    Clinical Status of Patient:  Outpatient (Ambulatory)    Chief Complaint:  Ramiro Hoffman is a 22 y.o. male who presents today for follow-up of anxiety.    Met with patient.      Interval History and Content of Current Session:  Interim Events/Subjective Report/Content of Current Session:        Doing okay over all, sad and a little angry about GF breaking up with him in February. Sad intermittently still but not depressed        Discussed and gave C and parents contacts nd info for DD related activities in [facility]       Ramiro is still maintaining social contact with one friend, with whom he visits for meals and watching horror movies.    Review of Systems   History obtained from the patient.  General ROS: negative for - fatigue and weight loss or weight gain  Psychological ROS: negative for - depression, hallucinations, hostility, mood swings, physical abuse, sexual abuse or sleep disturbances  Ophthalmic ROS: negative for - decreased vision or dry eyes  ENT ROS: negative for - epistaxis, nasal congestion or oral lesions  Hematological and Lymphatic ROS: negative for - bruising, jaundice or pallor  Endocrine ROS: negative for - galactorrhea, skin changes or temperature intolerance  Respiratory ROS: no cough or shortness of breath  Cardiovascular ROS: no chest pain or tachycardia  Gastrointestinal ROS: no abdominal pain, change in bowel habits  Urinary ROS: no dysuria, trouble voiding or hematuria  Neurological ROS: negative for - headaches, seizures, tremors. Eyeblink tics no longer happening  Dermatological ROS: negative for rash    Past Medical, Family and Social History: The patient's past medical, family and social history have been reviewed and updated as appropriate within the electronic medical record - see encounter notes.  Past Medical History:   Diagnosis Date    ADHD (attention deficit hyperactivity disorder)     Autism spectrum disorder  2004    Lactose intolerance      Current Outpatient Medications on File Prior to Visit   Medication Sig Dispense Refill    amantadine HCL (SYMMETREL) 100 mg capsule Take 1 capsule (100 mg total) by mouth every morning. 30 capsule 11    cetirizine (ZYRTEC) 10 MG tablet Take 1 tablet (10 mg total) by mouth 2 (two) times daily. for 10 days 20 tablet 0    EPINEPHrine (EPIPEN 2-ELKIN) 0.3 mg/0.3 mL AtIn Inject 0.3 mLs (0.3 mg total) into the muscle once as needed (Anaphylaxis). 0.3 mL 0    risperiDONE (RISPERDAL) 2 MG tablet Take 1 tablet (2 mg total) by mouth every evening. 30 tablet 11    triamcinolone acetonide 0.5% (KENALOG) 0.5 % Crea Apply topically 2 (two) times daily. (Patient not taking: Reported on 6/12/2023) 15 g 0     No current facility-administered medications on file prior to visit.     Compliance: yes  Side effects: None. Ramiro denies any problems with headache, stomach upset, weight loss, insomnia, chest pain, palpitations, tics, or tremors.    Risk Parameters:  Patient reports no suicidal ideation  Patient reports no homicidal ideation  Patient reports no self-injurious behavior  Patient reports no violent behavior   Ramiro denies any use of alcohol, cannabis, or other drugs.    Exam (detailed: at least 9 elements; comprehensive: all 15 elements)   Constitutional  Vitals:   weight is 65.8 kg (145 lb 1 oz). His blood pressure is 134/87 and his pulse is 79.      General:  age appropriate, casually dressed   Musculoskeletal  Muscle Strength/Tone:  no tremor, no tic.    Gait & Station:  non-ataxic   Psychiatric  Speech:  spontaneous, monotone, conversational volume, large quantity today   Mood & Affect:  anxious  blunted, mood-congruent   Thought Process:  concrete, with some over-personalized tangents   Associations:  intact   Thought Content:  no suicidality, no homicidality, delusions, or paranoia   Insight:  has awareness of illness, but it is concrete and rudimentary, though he is no longer  defensive about it   Judgement: impaired social understanding   Orientation:  person, place, time/date, situation, day of week   Memory: intact for content of interview   Language: awkward prosody, some semantic/pragmatic errors, low vocabulary for age.   Attention Span & Concentration:  able to focus   Fund of Knowledge:  familiar with aspects of current personal life     Assessment and Diagnosis   Status/Progress: Based on the examination today, the patient's problem(s) is/are  optimally controlled given his ability.   New problems have not been presented tat last visit and are not much improved today. Comorbidities are complicating management of the primary condition.  There are no active rule-out diagnoses for this patient at this time.    General Impression:   1. Autism spectrum disorder, level 2  fluvoxaMINE (LUVOX) 25 MG tablet    Ambulatory referral/consult to Psychiatry - Psy Authorization: Pharm + Psychotherapy,  6 x/yr      2. Adjustment disorder with mixed anxiety and depressed mood        3. Hyperkinesis of childhood with developmental delay        4. Obsessional thoughts  fluvoxaMINE (LUVOX) 25 MG tablet    Ambulatory referral/consult to Psychiatry - Psy Authorization: Pharm + Psychotherapy,  6 x/yr        Intervention/Counseling/Treatment Plan   Medication Management:   Continue risperidone 2 mg qHS   Continue amantadine 100 mg daily  Add fluvoxamine 25 mg po daily  Outside records/collateral information from additional sources: reviewed collateral reports from his parents  Counseling provided with family as follows: risks and benefits of treatment options, including medications, were discussed with the patient, risk factor reduction, family education  Risperidone monitoring labs due now- he did not follow through on the labs I entered last Spring    Return to Clinic: 3 months with Dr Patel

## 2023-07-10 ENCOUNTER — TELEPHONE (OUTPATIENT)
Dept: ADMINISTRATIVE | Facility: OTHER | Age: 23
End: 2023-07-10
Payer: MEDICAID

## 2023-09-14 ENCOUNTER — OFFICE VISIT (OUTPATIENT)
Dept: PSYCHIATRY | Facility: CLINIC | Age: 23
End: 2023-09-14
Payer: MEDICAID

## 2023-09-14 DIAGNOSIS — F90.2 ATTENTION DEFICIT HYPERACTIVITY DISORDER (ADHD), COMBINED TYPE: ICD-10-CM

## 2023-09-14 DIAGNOSIS — F84.0 AUTISM SPECTRUM DISORDER: Primary | ICD-10-CM

## 2023-09-14 DIAGNOSIS — F42.8 OBSESSIONAL THOUGHTS: ICD-10-CM

## 2023-09-14 PROCEDURE — 3044F HG A1C LEVEL LT 7.0%: CPT | Mod: CPTII,95,, | Performed by: INTERNAL MEDICINE

## 2023-09-14 PROCEDURE — 99214 OFFICE O/P EST MOD 30 MIN: CPT | Mod: AF,HB,95, | Performed by: INTERNAL MEDICINE

## 2023-09-14 PROCEDURE — 1159F PR MEDICATION LIST DOCUMENTED IN MEDICAL RECORD: ICD-10-PCS | Mod: CPTII,95,, | Performed by: INTERNAL MEDICINE

## 2023-09-14 PROCEDURE — 1160F PR REVIEW ALL MEDS BY PRESCRIBER/CLIN PHARMACIST DOCUMENTED: ICD-10-PCS | Mod: CPTII,95,, | Performed by: INTERNAL MEDICINE

## 2023-09-14 PROCEDURE — 90833 PSYTX W PT W E/M 30 MIN: CPT | Mod: AF,HB,95, | Performed by: INTERNAL MEDICINE

## 2023-09-14 PROCEDURE — 99214 PR OFFICE/OUTPT VISIT, EST, LEVL IV, 30-39 MIN: ICD-10-PCS | Mod: AF,HB,95, | Performed by: INTERNAL MEDICINE

## 2023-09-14 PROCEDURE — 3044F PR MOST RECENT HEMOGLOBIN A1C LEVEL <7.0%: ICD-10-PCS | Mod: CPTII,95,, | Performed by: INTERNAL MEDICINE

## 2023-09-14 PROCEDURE — 90833 PR PSYCHOTHERAPY W/PATIENT W/E&M, 30 MIN (ADD ON): ICD-10-PCS | Mod: AF,HB,95, | Performed by: INTERNAL MEDICINE

## 2023-09-14 PROCEDURE — 1159F MED LIST DOCD IN RCRD: CPT | Mod: CPTII,95,, | Performed by: INTERNAL MEDICINE

## 2023-09-14 PROCEDURE — 1160F RVW MEDS BY RX/DR IN RCRD: CPT | Mod: CPTII,95,, | Performed by: INTERNAL MEDICINE

## 2023-09-14 RX ORDER — FLUVOXAMINE MALEATE 25 MG/1
25 TABLET ORAL NIGHTLY
Qty: 30 TABLET | Refills: 3 | Status: SHIPPED | OUTPATIENT
Start: 2023-09-14 | End: 2023-11-14 | Stop reason: SDUPTHER
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
## 2023-09-15 PROBLEM — F43.23 ADJUSTMENT DISORDER WITH MIXED ANXIETY AND DEPRESSED MOOD: Status: RESOLVED | Noted: 2022-01-11 | Resolved: 2023-09-15

## 2023-09-15 PROBLEM — F42.8 OBSESSIONAL THOUGHTS: Status: ACTIVE | Noted: 2023-09-15

## 2023-11-14 ENCOUNTER — OFFICE VISIT (OUTPATIENT)
Dept: PSYCHIATRY | Facility: CLINIC | Age: 23
End: 2023-11-14
Payer: MEDICAID

## 2023-11-14 VITALS
WEIGHT: 141.63 LBS | HEART RATE: 91 BPM | BODY MASS INDEX: 20.39 KG/M2 | DIASTOLIC BLOOD PRESSURE: 82 MMHG | SYSTOLIC BLOOD PRESSURE: 138 MMHG

## 2023-11-14 DIAGNOSIS — F42.8 OBSESSIONAL THOUGHTS: Primary | ICD-10-CM

## 2023-11-14 DIAGNOSIS — F90.8 HYPERKINESIS OF CHILDHOOD WITH DEVELOPMENTAL DELAY: ICD-10-CM

## 2023-11-14 DIAGNOSIS — F84.0 AUTISM SPECTRUM DISORDER: ICD-10-CM

## 2023-11-14 PROCEDURE — 3008F PR BODY MASS INDEX (BMI) DOCUMENTED: ICD-10-PCS | Mod: AF,HB,CPTII, | Performed by: INTERNAL MEDICINE

## 2023-11-14 PROCEDURE — 1160F RVW MEDS BY RX/DR IN RCRD: CPT | Mod: AF,HB,CPTII, | Performed by: INTERNAL MEDICINE

## 2023-11-14 PROCEDURE — 3075F SYST BP GE 130 - 139MM HG: CPT | Mod: AF,HB,CPTII, | Performed by: INTERNAL MEDICINE

## 2023-11-14 PROCEDURE — 99212 OFFICE O/P EST SF 10 MIN: CPT | Mod: PBBFAC | Performed by: INTERNAL MEDICINE

## 2023-11-14 PROCEDURE — 99999 PR PBB SHADOW E&M-EST. PATIENT-LVL II: CPT | Mod: PBBFAC,AF,HB, | Performed by: INTERNAL MEDICINE

## 2023-11-14 PROCEDURE — 3079F DIAST BP 80-89 MM HG: CPT | Mod: AF,HB,CPTII, | Performed by: INTERNAL MEDICINE

## 2023-11-14 PROCEDURE — 3044F PR MOST RECENT HEMOGLOBIN A1C LEVEL <7.0%: ICD-10-PCS | Mod: AF,HB,CPTII, | Performed by: INTERNAL MEDICINE

## 2023-11-14 PROCEDURE — 3008F BODY MASS INDEX DOCD: CPT | Mod: AF,HB,CPTII, | Performed by: INTERNAL MEDICINE

## 2023-11-14 PROCEDURE — 1159F MED LIST DOCD IN RCRD: CPT | Mod: AF,HB,CPTII, | Performed by: INTERNAL MEDICINE

## 2023-11-14 PROCEDURE — 1160F PR REVIEW ALL MEDS BY PRESCRIBER/CLIN PHARMACIST DOCUMENTED: ICD-10-PCS | Mod: AF,HB,CPTII, | Performed by: INTERNAL MEDICINE

## 2023-11-14 PROCEDURE — 3044F HG A1C LEVEL LT 7.0%: CPT | Mod: AF,HB,CPTII, | Performed by: INTERNAL MEDICINE

## 2023-11-14 PROCEDURE — 3075F PR MOST RECENT SYSTOLIC BLOOD PRESS GE 130-139MM HG: ICD-10-PCS | Mod: AF,HB,CPTII, | Performed by: INTERNAL MEDICINE

## 2023-11-14 PROCEDURE — 90833 PR PSYCHOTHERAPY W/PATIENT W/E&M, 30 MIN (ADD ON): ICD-10-PCS | Mod: AF,HB,, | Performed by: INTERNAL MEDICINE

## 2023-11-14 PROCEDURE — 99999 PR PBB SHADOW E&M-EST. PATIENT-LVL II: ICD-10-PCS | Mod: PBBFAC,AF,HB, | Performed by: INTERNAL MEDICINE

## 2023-11-14 PROCEDURE — 1159F PR MEDICATION LIST DOCUMENTED IN MEDICAL RECORD: ICD-10-PCS | Mod: AF,HB,CPTII, | Performed by: INTERNAL MEDICINE

## 2023-11-14 PROCEDURE — 3079F PR MOST RECENT DIASTOLIC BLOOD PRESSURE 80-89 MM HG: ICD-10-PCS | Mod: AF,HB,CPTII, | Performed by: INTERNAL MEDICINE

## 2023-11-14 PROCEDURE — 99214 OFFICE O/P EST MOD 30 MIN: CPT | Mod: S$PBB,AF,HB, | Performed by: INTERNAL MEDICINE

## 2023-11-14 PROCEDURE — 99214 PR OFFICE/OUTPT VISIT, EST, LEVL IV, 30-39 MIN: ICD-10-PCS | Mod: S$PBB,AF,HB, | Performed by: INTERNAL MEDICINE

## 2023-11-14 PROCEDURE — 90833 PSYTX W PT W E/M 30 MIN: CPT | Mod: AF,HB,, | Performed by: INTERNAL MEDICINE

## 2023-11-14 RX ORDER — FLUVOXAMINE MALEATE 50 MG/1
50 TABLET ORAL NIGHTLY
Qty: 30 TABLET | Refills: 2 | Status: SHIPPED | OUTPATIENT
Start: 2023-11-14 | End: 2024-02-14
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
## 2024-02-14 DIAGNOSIS — F84.0 AUTISM SPECTRUM DISORDER: ICD-10-CM

## 2024-02-14 DIAGNOSIS — F42.8 OBSESSIONAL THOUGHTS: ICD-10-CM

## 2024-02-14 RX ORDER — FLUVOXAMINE MALEATE 50 MG/1
50 TABLET, FILM COATED ORAL NIGHTLY
Qty: 30 TABLET | Refills: 2 | Status: SHIPPED | OUTPATIENT
Start: 2024-02-14 | End: 2024-04-22

## 2024-04-20 DIAGNOSIS — F42.8 OBSESSIONAL THOUGHTS: ICD-10-CM

## 2024-04-20 DIAGNOSIS — F84.0 AUTISM SPECTRUM DISORDER: ICD-10-CM

## 2024-04-22 RX ORDER — FLUVOXAMINE MALEATE 50 MG/1
50 TABLET, FILM COATED ORAL NIGHTLY
Qty: 30 TABLET | Refills: 2 | Status: SHIPPED | OUTPATIENT
Start: 2024-04-22

## 2024-05-03 DIAGNOSIS — F90.8 HYPERKINESIS OF CHILDHOOD WITH DEVELOPMENTAL DELAY: ICD-10-CM

## 2024-05-03 DIAGNOSIS — F84.0 AUTISM SPECTRUM DISORDER: ICD-10-CM

## 2024-05-06 RX ORDER — AMANTADINE HYDROCHLORIDE 100 MG/1
100 CAPSULE, GELATIN COATED ORAL EVERY MORNING
Qty: 30 CAPSULE | Refills: 11 | Status: SHIPPED | OUTPATIENT
Start: 2024-05-06

## 2024-05-22 ENCOUNTER — TELEPHONE (OUTPATIENT)
Dept: PSYCHIATRY | Facility: CLINIC | Age: 24
End: 2024-05-22
Payer: MEDICAID

## 2024-05-22 DIAGNOSIS — F84.0 AUTISM SPECTRUM DISORDER: ICD-10-CM

## 2024-05-22 RX ORDER — RISPERIDONE 2 MG/1
2 TABLET ORAL NIGHTLY
Qty: 30 TABLET | Refills: 11 | Status: SHIPPED | OUTPATIENT
Start: 2024-05-22

## 2024-05-22 NOTE — TELEPHONE ENCOUNTER
----- Message from Eda Olson RN sent at 5/22/2024 10:56 AM CDT -----  Regarding: FW: Refill    ----- Message -----  From: Anny Sloan  Sent: 5/22/2024  10:41 AM CDT  To: [facility] Clinical Staff  Subject: Refill                                           Mother is requesting a refill for risperiDONE (RISPERDAL) 2 MG tablet from [facility]/pharmacy #[id number] - [place] - [address] [place] [address] [place], Phone: [phone], Fax: [phone].  She is requesting you refill his medication for another year.    Last seen 11/14/23 with no future scheduled appointments.    She can be reached at [phone].    Thank you.

## 2024-06-10 ENCOUNTER — PATIENT MESSAGE (OUTPATIENT)
Dept: INTERNAL MEDICINE | Facility: CLINIC | Age: 24
End: 2024-06-10
Payer: MEDICAID

## 2024-06-17 ENCOUNTER — OFFICE VISIT (OUTPATIENT)
Dept: INTERNAL MEDICINE | Facility: CLINIC | Age: 24
End: 2024-06-17
Payer: MEDICAID

## 2024-06-17 VITALS
HEIGHT: 71 IN | DIASTOLIC BLOOD PRESSURE: 80 MMHG | SYSTOLIC BLOOD PRESSURE: 115 MMHG | HEART RATE: 75 BPM | BODY MASS INDEX: 22.01 KG/M2 | OXYGEN SATURATION: 97 % | WEIGHT: 157.19 LBS

## 2024-06-17 DIAGNOSIS — B35.6 JOCK ITCH: ICD-10-CM

## 2024-06-17 DIAGNOSIS — Z00.00 ROUTINE GENERAL MEDICAL EXAMINATION AT A HEALTH CARE FACILITY: Primary | ICD-10-CM

## 2024-06-17 PROCEDURE — 99214 OFFICE O/P EST MOD 30 MIN: CPT | Mod: PBBFAC | Performed by: INTERNAL MEDICINE

## 2024-06-17 PROCEDURE — 3074F SYST BP LT 130 MM HG: CPT | Mod: CPTII,,, | Performed by: INTERNAL MEDICINE

## 2024-06-17 PROCEDURE — 3008F BODY MASS INDEX DOCD: CPT | Mod: CPTII,,, | Performed by: INTERNAL MEDICINE

## 2024-06-17 PROCEDURE — 1159F MED LIST DOCD IN RCRD: CPT | Mod: CPTII,,, | Performed by: INTERNAL MEDICINE

## 2024-06-17 PROCEDURE — 1160F RVW MEDS BY RX/DR IN RCRD: CPT | Mod: CPTII,,, | Performed by: INTERNAL MEDICINE

## 2024-06-17 PROCEDURE — 99999 PR PBB SHADOW E&M-EST. PATIENT-LVL IV: CPT | Mod: PBBFAC,,, | Performed by: INTERNAL MEDICINE

## 2024-06-17 PROCEDURE — 99395 PREV VISIT EST AGE 18-39: CPT | Mod: S$PBB,,, | Performed by: INTERNAL MEDICINE

## 2024-06-17 PROCEDURE — 3079F DIAST BP 80-89 MM HG: CPT | Mod: CPTII,,, | Performed by: INTERNAL MEDICINE

## 2024-06-17 NOTE — PROGRESS NOTES
Subjective:       Patient ID: Ramiro Hoffman is a 23 y.o. male.    Chief Complaint: Annual Exam    Here for annual exam    Having some GF issues.  Increased emotions -- anxiety/depression. Affects his sleep too.    Has been drinking incd sugary drinks, some caffeinated. Does have overall well balanced diet with varied foods.      Review of Systems   Constitutional:  Negative for appetite change and unexpected weight change.   Respiratory:  Negative for chest tightness and shortness of breath.    Cardiovascular:  Negative for chest pain.   Gastrointestinal:  Negative for abdominal pain.   Genitourinary:  Negative for difficulty urinating, scrotal swelling and testicular pain.   Skin:  Positive for rash.        Itchy scrotal area   Neurological:  Negative for dizziness, seizures, syncope, facial asymmetry, speech difficulty, weakness, light-headedness, numbness and headaches.        Eye twitch   Psychiatric/Behavioral:  The patient is nervous/anxious.            Objective:      Physical Exam  Vitals reviewed.   Constitutional:       General: He is not in acute distress.     Appearance: Normal appearance. He is well-developed. He is not ill-appearing, toxic-appearing or diaphoretic.   HENT:      Head: Normocephalic and atraumatic.   Eyes:      General: No scleral icterus.  Neck:      Thyroid: No thyromegaly.   Cardiovascular:      Rate and Rhythm: Normal rate and regular rhythm.      Heart sounds: Normal heart sounds. No murmur heard.     No friction rub. No gallop.      Comments: Mild pectus excavatum observed. Not tender  Pulmonary:      Effort: Pulmonary effort is normal. No respiratory distress.      Breath sounds: Normal breath sounds. No wheezing or rales.   Abdominal:      General: Bowel sounds are normal. There is no distension.      Palpations: Abdomen is soft. There is no mass.      Tenderness: There is no abdominal tenderness. There is no guarding or rebound.   Musculoskeletal:         General: Normal  range of motion.      Cervical back: Normal range of motion.   Lymphadenopathy:      Cervical: No cervical adenopathy.   Skin:     Findings: No lesion.   Neurological:      Mental Status: He is alert and oriented to person, place, and time.      Cranial Nerves: No cranial nerve deficit.      Sensory: No sensory deficit.      Motor: No weakness.      Coordination: Coordination normal.      Gait: Gait normal.      Deep Tendon Reflexes: Reflexes normal.   Psychiatric:         Mood and Affect: Mood normal.         Behavior: Behavior normal.         Thought Content: Thought content normal.      Comments: Calm, interactive         Assessment:       1. Routine general medical examination at a health care facility    2. Jock itch        Plan:       Ramiro was seen today for annual exam.    Diagnoses and all orders for this visit:    Routine general medical examination at a health care facility  Healthy, discussed less sugary drinks    Jock itch  Patient Instructions   Try over the counter lotrimin or clotrimazole cream twice per day for about 2 weeks for jock itch    Anxiety/mood:  See back Dr Patel              Health Maintenance         Date Due Completion Date    COVID-19 Vaccine (5 - 2023-24 season) 09/01/2023 10/13/2022    TETANUS VACCINE 06/03/2032 6/3/2022    DTaP/Tdap/Td Vaccines (8 - Td or Tdap) 06/03/2032 6/3/2022            Visit today included increased complexity associated with the care of the episodic problem  addressed and managing the longitudinal care of the patient due to the serious and/or complex managed problem(s) .    Follow up in about 1 year (around 6/17/2025).    No future appointments.

## 2024-06-17 NOTE — PATIENT INSTRUCTIONS
Try over the counter lotrimin or clotrimazole cream twice per day for about 2 weeks for jock itch    See back Dr Patel

## 2024-07-13 DIAGNOSIS — F42.8 OBSESSIONAL THOUGHTS: ICD-10-CM

## 2024-07-13 DIAGNOSIS — F84.0 AUTISM SPECTRUM DISORDER: ICD-10-CM

## 2024-07-15 RX ORDER — FLUVOXAMINE MALEATE 50 MG/1
50 TABLET, FILM COATED ORAL NIGHTLY
Qty: 30 TABLET | Refills: 2 | Status: SHIPPED | OUTPATIENT
Start: 2024-07-15

## 2024-07-29 ENCOUNTER — OFFICE VISIT (OUTPATIENT)
Dept: PSYCHIATRY | Facility: CLINIC | Age: 24
End: 2024-07-29
Payer: MEDICAID

## 2024-07-29 DIAGNOSIS — F43.21 ADJUSTMENT DISORDER WITH DEPRESSED MOOD: ICD-10-CM

## 2024-07-29 DIAGNOSIS — F42.8 OBSESSIONAL THOUGHTS: ICD-10-CM

## 2024-07-29 DIAGNOSIS — F90.2 ATTENTION DEFICIT HYPERACTIVITY DISORDER (ADHD), COMBINED TYPE: ICD-10-CM

## 2024-07-29 DIAGNOSIS — F84.0 AUTISM SPECTRUM DISORDER: Primary | ICD-10-CM

## 2024-07-29 PROCEDURE — 99214 OFFICE O/P EST MOD 30 MIN: CPT | Mod: AF,HB,95, | Performed by: INTERNAL MEDICINE

## 2024-07-29 PROCEDURE — 1159F MED LIST DOCD IN RCRD: CPT | Mod: CPTII,95,, | Performed by: INTERNAL MEDICINE

## 2024-07-29 PROCEDURE — 1160F RVW MEDS BY RX/DR IN RCRD: CPT | Mod: CPTII,95,, | Performed by: INTERNAL MEDICINE

## 2024-08-05 PROBLEM — F90.2 ATTENTION DEFICIT HYPERACTIVITY DISORDER (ADHD), COMBINED TYPE: Status: ACTIVE | Noted: 2024-08-05

## 2025-03-24 DIAGNOSIS — Z00.00 ENCOUNTER FOR MEDICARE ANNUAL WELLNESS EXAM: ICD-10-CM

## 2025-04-26 DIAGNOSIS — F84.0 AUTISM SPECTRUM DISORDER: ICD-10-CM

## 2025-04-26 DIAGNOSIS — F90.8 HYPERKINESIS OF CHILDHOOD WITH DEVELOPMENTAL DELAY: ICD-10-CM

## 2025-04-28 RX ORDER — AMANTADINE HYDROCHLORIDE 100 MG/1
100 CAPSULE, GELATIN COATED ORAL EVERY MORNING
Qty: 30 CAPSULE | Refills: 2 | Status: SHIPPED | OUTPATIENT
Start: 2025-04-28

## 2025-05-07 DIAGNOSIS — F84.0 AUTISM SPECTRUM DISORDER: ICD-10-CM

## 2025-05-07 RX ORDER — RISPERIDONE 2 MG/1
2 TABLET ORAL NIGHTLY
Qty: 90 TABLET | Refills: 0 | Status: SHIPPED | OUTPATIENT
Start: 2025-05-07

## 2025-05-28 ENCOUNTER — TELEPHONE (OUTPATIENT)
Dept: PSYCHIATRY | Facility: CLINIC | Age: 25
End: 2025-05-28
Payer: MEDICAID

## 2025-05-28 NOTE — TELEPHONE ENCOUNTER
----- Message from Anny sent at 5/28/2025 12:06 PM CDT -----  Regarding: RE: Claim  Diagnosis and that he is disabled.  He is aging out of dental insurance and if they can prove he's disabled, he can remain on the plan.  ----- Message -----  From: Geno Patel MD  Sent: 5/28/2025  11:48 AM CDT  To: Anny Sloan  Subject: RE: Claim                                        Happy to send the letter.  Can you call him back and ask him exactly what letter needs to include and I'll send it to their portal today.  I assume how long I have been seeing him and diagnoses?  ----- Message -----  From: Anny Sloan  Sent: 5/28/2025  11:30 AM CDT  To: Geno Patel MD  Subject: Claim                                            Father is requesting a returned call because he needs a letter stating the patient's condition for an insurance claim.He can be reached at [phone].Thank you.

## 2025-05-28 NOTE — LETTER
May 28, 2025      [facility] [address]  [address]  [place] [address]  Phone: [phone]  Fax: [phone]       Patient: Ramiro Hoffman   YOB: 2000    To Whom It May Concern:    I have been treating Ramiro Hoffman at [facility] since 09/14/2023.  His current psychiatric diagnoses include Autism spectrum disorder (level 2 - requires substantial support for daily functioning), Anxiety with obsessive thoughts, and ADHD.  Due to these disabilities, Ramiro depends on his parents for care at home.  If you have any questions or concerns, or if I can be of further assistance, please do not hesitate to contact me.    Sincerely,     Geno Patel MD

## 2025-07-14 ENCOUNTER — LAB VISIT (OUTPATIENT)
Dept: LAB | Facility: HOSPITAL | Age: 25
End: 2025-07-14
Attending: INTERNAL MEDICINE
Payer: MEDICAID

## 2025-07-14 ENCOUNTER — OFFICE VISIT (OUTPATIENT)
Dept: INTERNAL MEDICINE | Facility: CLINIC | Age: 25
End: 2025-07-14
Payer: MEDICAID

## 2025-07-14 VITALS
BODY MASS INDEX: 23.77 KG/M2 | OXYGEN SATURATION: 97 % | HEIGHT: 71 IN | DIASTOLIC BLOOD PRESSURE: 70 MMHG | WEIGHT: 169.75 LBS | SYSTOLIC BLOOD PRESSURE: 116 MMHG | HEART RATE: 80 BPM

## 2025-07-14 DIAGNOSIS — Z00.00 ROUTINE GENERAL MEDICAL EXAMINATION AT A HEALTH CARE FACILITY: Primary | ICD-10-CM

## 2025-07-14 DIAGNOSIS — F84.0 AUTISM SPECTRUM DISORDER: ICD-10-CM

## 2025-07-14 DIAGNOSIS — Z00.00 ROUTINE GENERAL MEDICAL EXAMINATION AT A HEALTH CARE FACILITY: ICD-10-CM

## 2025-07-14 LAB
ABSOLUTE EOSINOPHIL (OHS): 0.02 K/UL
ABSOLUTE MONOCYTE (OHS): 0.48 K/UL (ref 0.3–1)
ABSOLUTE NEUTROPHIL COUNT (OHS): 4.45 K/UL (ref 1.8–7.7)
ALBUMIN SERPL BCP-MCNC: 4.5 G/DL (ref 3.5–5.2)
ALP SERPL-CCNC: 84 UNIT/L (ref 40–150)
ALT SERPL W/O P-5'-P-CCNC: 13 UNIT/L (ref 10–44)
ANION GAP (OHS): 10 MMOL/L (ref 8–16)
AST SERPL-CCNC: 13 UNIT/L (ref 11–45)
BASOPHILS # BLD AUTO: 0.03 K/UL
BASOPHILS NFR BLD AUTO: 0.4 %
BILIRUB SERPL-MCNC: 0.3 MG/DL (ref 0.1–1)
BUN SERPL-MCNC: 13 MG/DL (ref 6–20)
CALCIUM SERPL-MCNC: 9.1 MG/DL (ref 8.7–10.5)
CHLORIDE SERPL-SCNC: 104 MMOL/L (ref 95–110)
CO2 SERPL-SCNC: 23 MMOL/L (ref 23–29)
CREAT SERPL-MCNC: 0.8 MG/DL (ref 0.5–1.4)
ERYTHROCYTE [DISTWIDTH] IN BLOOD BY AUTOMATED COUNT: 11.8 % (ref 11.5–14.5)
GFR SERPLBLD CREATININE-BSD FMLA CKD-EPI: >60 ML/MIN/1.73/M2
GLUCOSE SERPL-MCNC: 87 MG/DL (ref 70–110)
HCT VFR BLD AUTO: 42 % (ref 40–54)
HGB BLD-MCNC: 14.3 GM/DL (ref 14–18)
IMM GRANULOCYTES # BLD AUTO: 0.03 K/UL (ref 0–0.04)
IMM GRANULOCYTES NFR BLD AUTO: 0.4 % (ref 0–0.5)
LYMPHOCYTES # BLD AUTO: 1.83 K/UL (ref 1–4.8)
MCH RBC QN AUTO: 29.2 PG (ref 27–31)
MCHC RBC AUTO-ENTMCNC: 34 G/DL (ref 32–36)
MCV RBC AUTO: 86 FL (ref 82–98)
NUCLEATED RBC (/100WBC) (OHS): 0 /100 WBC
PLATELET # BLD AUTO: 235 K/UL (ref 150–450)
PMV BLD AUTO: 10 FL (ref 9.2–12.9)
POTASSIUM SERPL-SCNC: 3.9 MMOL/L (ref 3.5–5.1)
PROT SERPL-MCNC: 7.8 GM/DL (ref 6–8.4)
RBC # BLD AUTO: 4.9 M/UL (ref 4.6–6.2)
RELATIVE EOSINOPHIL (OHS): 0.3 %
RELATIVE LYMPHOCYTE (OHS): 26.8 % (ref 18–48)
RELATIVE MONOCYTE (OHS): 7 % (ref 4–15)
RELATIVE NEUTROPHIL (OHS): 65.1 % (ref 38–73)
SODIUM SERPL-SCNC: 137 MMOL/L (ref 136–145)
WBC # BLD AUTO: 6.84 K/UL (ref 3.9–12.7)

## 2025-07-14 PROCEDURE — 1159F MED LIST DOCD IN RCRD: CPT | Mod: CPTII,,, | Performed by: INTERNAL MEDICINE

## 2025-07-14 PROCEDURE — 3078F DIAST BP <80 MM HG: CPT | Mod: CPTII,,, | Performed by: INTERNAL MEDICINE

## 2025-07-14 PROCEDURE — 84132 ASSAY OF SERUM POTASSIUM: CPT

## 2025-07-14 PROCEDURE — 99999 PR PBB SHADOW E&M-EST. PATIENT-LVL IV: CPT | Mod: PBBFAC,,, | Performed by: INTERNAL MEDICINE

## 2025-07-14 PROCEDURE — 3008F BODY MASS INDEX DOCD: CPT | Mod: CPTII,,, | Performed by: INTERNAL MEDICINE

## 2025-07-14 PROCEDURE — 1160F RVW MEDS BY RX/DR IN RCRD: CPT | Mod: CPTII,,, | Performed by: INTERNAL MEDICINE

## 2025-07-14 PROCEDURE — 3074F SYST BP LT 130 MM HG: CPT | Mod: CPTII,,, | Performed by: INTERNAL MEDICINE

## 2025-07-14 PROCEDURE — 99395 PREV VISIT EST AGE 18-39: CPT | Mod: S$PBB,,, | Performed by: INTERNAL MEDICINE

## 2025-07-14 PROCEDURE — 99214 OFFICE O/P EST MOD 30 MIN: CPT | Mod: PBBFAC | Performed by: INTERNAL MEDICINE

## 2025-07-14 PROCEDURE — 85025 COMPLETE CBC W/AUTO DIFF WBC: CPT

## 2025-07-14 PROCEDURE — 36415 COLL VENOUS BLD VENIPUNCTURE: CPT

## 2025-07-14 NOTE — PROGRESS NOTES
Subjective     Patient ID: Ramiro Hoffman is a 24 y.o. male.    Chief Complaint: Annual Exam             History of Present Illness    CHIEF COMPLAINT:  Ramiro presents today for follow up regarding soda consumption and urination issues.    GENITOURINARY:  He reports frequent urination occurring in cycles every hour or two, needing to urinate multiple times within short intervals. He experiences nocturia, waking in the middle of the night with urgency to urinate. He denies burning or stinging with urination. He notes uncertainty about potential relationship between frequent urination and soda consumption.    DIET AND WEIGHT MANAGEMENT:  He consumes one soda per day, occasionally increasing to two sodas daily including Monster, Coke, Mountain Dew, and Dr. Pepper. He acknowledges awareness of excessive beverage consumption and expresses desire to reduce intake. He reports recent dietary changes following a , including consuming leftover donuts. He currently eats processed foods such as Lunchables and similar packaged products. He experiences occasional vomiting after eating, which appears related to eating too quickly, consuming food within less than a minute of being served. He has gained significant weight recently. He attempts to cook healthy meals but struggles with vegetable consumption, preferring processed and convenience foods.    ALCOHOL USE:  He reports intermittent alcohol consumption of [place] beer, drinking approximately one beer every few days. He acknowledges attempting to reduce alcohol intake.    FATIGUE:  He reports significant fatigue and low energy levels, describing feeling extremely lethargic and struggling to perform basic tasks such as taking out the trash. He expresses difficulty motivating himself to move and complete simple activities, noting a pervasive sense of exhaustion that impedes his daily functioning.    EXERCISE:  He reports walking with his mother, though not as  frequently as usual recently. He sometimes walks around inside the house. Current exercise routine appears limited and less consistent compared to prior periods.    FAMILY HISTORY:  Uncle recently  from rare cancer originating in [place] with a lung tumor. Family history significant for high blood pressure, high cholesterol, and potential diabetes risk.      ROS:  Constitutional: negative activity change, negative unexpected weight change, +fatigue  HENT: negative trouble swallowing  Eyes: negative discharge, negative visual disturbance  Respiratory: negative chest tightness, negative wheezing, negative shortness of breath  Cardiovascular: negative chest pain, negative palpitations  Gastrointestinal: negative blood in stool, +constipation, negative diarrhea, +vomiting  Endocrine: negative polydipsia, +polyuria  Genitourinary: negative difficulty urinating, negative dysuria, negative hematuria  Musculoskeletal: negative arthralgias, negative joint swelling, negative neck pain  Neurological: negative weakness, negative headaches  Psychiatric/Behavioral: negative confusion, negative dysphoric mood   Frequ drinks soda    Freq urination at times.          HPI  Review of Systems     Objective     Physical Exam  Vitals reviewed.   Constitutional:       General: He is not in acute distress.     Appearance: Normal appearance. He is well-developed. He is not ill-appearing, toxic-appearing or diaphoretic.   HENT:      Head: Normocephalic and atraumatic.   Eyes:      General: No scleral icterus.  Neck:      Thyroid: No thyromegaly.   Cardiovascular:      Rate and Rhythm: Normal rate and regular rhythm.      Heart sounds: Normal heart sounds. No murmur heard.     No friction rub. No gallop.   Pulmonary:      Effort: Pulmonary effort is normal. No respiratory distress.      Breath sounds: Normal breath sounds. No wheezing or rales.   Abdominal:      General: Bowel sounds are normal. There is no distension.      Palpations:  Abdomen is soft. There is no mass.      Tenderness: There is no abdominal tenderness. There is no guarding or rebound.   Musculoskeletal:         General: Normal range of motion.      Cervical back: Normal range of motion.   Lymphadenopathy:      Cervical: No cervical adenopathy.   Skin:     Findings: No lesion.   Neurological:      Mental Status: He is alert and oriented to person, place, and time.      Cranial Nerves: No cranial nerve deficit.      Sensory: No sensory deficit.      Motor: No weakness.      Coordination: Coordination normal.      Gait: Gait normal.      Deep Tendon Reflexes: Reflexes normal.   Psychiatric:         Mood and Affect: Mood normal.         Behavior: Behavior normal.         Thought Content: Thought content normal.      Comments: Calm, interactive            Assessment and Plan     1. Routine general medical examination at a health care facility  -     CBC Auto Differential; Future; Expected date: 07/14/2025  -     Comprehensive Metabolic Panel; Future; Expected date: 07/14/2025    2. Autism spectrum disorder  -     Ambulatory referral/consult to Psychiatry; Future; Expected date: 07/21/2025        Assessment & Plan    Weight gain:  - Noted significant weight gain of 28 lbs over past 2 years, with 12 lbs gained in the last year.  - Ramiro has been consuming excessive amounts of donuts, Lunchables, and [email or web address].  - Sometimes vomits after overeating, which could be due to acid reflux or eating too rapidly.  - Assessed the impact of psychiatric medications on appetite and weight.  - Recommend slowing down eating pace to aid digestion and prevent vomiting after meals.    FATIGUE:  - Noted the patient reports feeling like he does not have the energy to complete tasks, such as taking out the trash, occurring every other day.    EPIGASTRIC PAIN:  - Noted the patient reports pain upon palpation of the epigastric area.  - Ramiro reports chest discomfort, which he believes might  be related to stomach issues.  Call if symptoms worsen, exam is nml.    LIFESTYLE-RELATED PROBLEMS:  - Ramiro consumes multiple sodas daily including Monster, Coke, Mountain Dew, and Dr. Pepper, which likely contributes to frequent urination and weight gain.  - Advised reduction in soda intake, particularly when socializing with friends.  - Ramiro to continue current physical activities like walking and household chores.  - Recommend swimming as a form of exercise, especially during hot weather.  - No immediate concerns regarding spine and posture.  - Family to check and schedule next eye exam appointment.    ALCOHOL USE:  - Noted the patient consumes [place] beer a few days per week and is attempting to reduce alcohol consumption.    FAMILY HISTORY OF DIABETES:  - Documented that mother has diabetes.  - Considered family history of hypertension, hypercholesterolemia, and diabetes in recommending labs.  - Ordered labs to check renal and hepatic function, complete blood count, lipid panel, and glucose levels.    FAMILY HISTORY OF LUNG CANCER:  - Documented that the patient's uncle  from a rare form of pulmonary cancer originating from [place].    FOLLOW-UP:  - Consider scheduling follow up with psychiatry department for medication review.                    Health Maintenance         Date Due Completion Date    Influenza Vaccine (1) 2025 10/17/2024    TETANUS VACCINE 2032 6/3/2022    DTaP/Tdap/Td Vaccines (8 - Td or Tdap) 2032 6/3/2022    RSV Vaccine (Age 60+ and Pregnant patients) (1 - 1-dose 75+ series) 2074 ---          Follow up in about 1 year (around 2026).    Visit today included increased complexity associated with the care of the episodic problem  addressed and managing the longitudinal care of the patient due to the serious and/or complex managed problem(s) .    Future Appointments   Date Time Provider Department Center   2025 10:00 AM Scott Hinton MD [facility]  [facility]         This note was generated with the assistance of ambient listening technology. Verbal consent was obtained by the patient and accompanying visitor(s) for the recording of patient appointment to facilitate this note. I attest to having reviewed and edited the generated note for accuracy, though some syntax or spelling errors may persist. Please contact the author of this note for any clarification.

## 2025-08-09 DIAGNOSIS — F84.0 AUTISM SPECTRUM DISORDER: ICD-10-CM

## 2025-08-11 RX ORDER — RISPERIDONE 2 MG/1
2 TABLET ORAL NIGHTLY
Qty: 90 TABLET | Refills: 0 | Status: SHIPPED | OUTPATIENT
Start: 2025-08-11

## (undated) DEVICE — ELECTRODE NEEDLE 2.8IN

## (undated) DEVICE — GAUZE FLUFF XXLG 36X36 2 PLY

## (undated) DEVICE — SEE MEDLINE ITEM 157148

## (undated) DEVICE — SUSPENSORY X-LARGE

## (undated) DEVICE — SUT CHROMIC 3-0 SH 27IN GUT

## (undated) DEVICE — BRIEF STRTCH MESH XX-LG

## (undated) DEVICE — ADHESIVE DERMABOND ADVANCED

## (undated) DEVICE — ELECTRODE REM PLYHSV RETURN 9

## (undated) DEVICE — TRAY MINOR GEN SURG